# Patient Record
Sex: FEMALE | Race: WHITE | NOT HISPANIC OR LATINO | Employment: OTHER | URBAN - METROPOLITAN AREA
[De-identification: names, ages, dates, MRNs, and addresses within clinical notes are randomized per-mention and may not be internally consistent; named-entity substitution may affect disease eponyms.]

---

## 2023-08-31 PROBLEM — E11.65 UNCONTROLLED TYPE 2 DIABETES MELLITUS WITH HYPERGLYCEMIA (MULTI): Status: ACTIVE | Noted: 2023-08-31

## 2023-08-31 PROBLEM — E78.2 MIXED HYPERLIPIDEMIA: Status: ACTIVE | Noted: 2023-08-31

## 2023-08-31 PROBLEM — I10 ESSENTIAL HYPERTENSION: Status: ACTIVE | Noted: 2023-08-31

## 2023-08-31 PROBLEM — H26.9 CATARACTA: Status: ACTIVE | Noted: 2023-08-31

## 2023-08-31 RX ORDER — LANCETS 26 GAUGE
EACH MISCELLANEOUS
COMMUNITY
Start: 2019-07-23

## 2023-08-31 RX ORDER — PRAVASTATIN SODIUM 40 MG/1
1 TABLET ORAL DAILY
COMMUNITY
End: 2023-11-20 | Stop reason: ALTCHOICE

## 2023-08-31 RX ORDER — BLOOD SUGAR DIAGNOSTIC
STRIP MISCELLANEOUS
COMMUNITY

## 2023-08-31 RX ORDER — DULAGLUTIDE 1.5 MG/.5ML
INJECTION, SOLUTION SUBCUTANEOUS
COMMUNITY
Start: 2023-05-30 | End: 2023-11-20 | Stop reason: ALTCHOICE

## 2023-08-31 RX ORDER — DULAGLUTIDE 0.75 MG/.5ML
INJECTION, SOLUTION SUBCUTANEOUS
COMMUNITY
Start: 2022-11-29 | End: 2023-11-20 | Stop reason: ALTCHOICE

## 2023-08-31 RX ORDER — METFORMIN HYDROCHLORIDE 500 MG/1
2 TABLET, EXTENDED RELEASE ORAL 2 TIMES DAILY
COMMUNITY
End: 2024-01-02 | Stop reason: SDUPTHER

## 2023-11-18 NOTE — PROGRESS NOTES
HPI:  69 yo with Diabetes 2 (dx 2019) b12 def, Dyslipidemia presents for followup. Last A1c 5.6%, today 5.7%. Pt is testing sugars 1-2 times per day. Pt is having low sugars 0 times/week. Pt's typical blood sugars 's on waking, after dinner up to 170's . Pt is following a carb controlled diet and knows reasonable carb allowances. Pt is able to afford their medications. Pt is not all that active.         T  aking metfomin 500mg er (4), came off trulicity due to cost in july 2020, still expensive at [facility] in 2021/doesn't qualify for pt assistance. Was on trulicity 0.75mg may 2023 visit, pt currently off it due to cost.           Pt has not been taking pravastatin 40mg for some time.           Pt taking b12 1mg day.            /67   Pulse 64   Wt 87.3 kg (192 lb 6.4 oz)   BMI 33.55 kg/m²     Labs:  Lab Results   Component Value Date    WBC 4.4 (L) 05/31/2023    NRBC 0 05/31/2023    RBC 4.80 05/31/2023    HGB 13.0 05/31/2023    HCT 40.1 05/31/2023     05/31/2023     Lab Results   Component Value Date    CALCIUM 9.1 05/31/2023    AST 11 05/31/2023    ALKPHOS 63 05/31/2023    BILITOT 0.8 05/31/2023    PROT 6.5 05/31/2023    ALBUMIN 4.0 05/31/2023    GLOB 2.5 05/31/2023    AGR 1.6 05/31/2023     05/31/2023    K 3.8 05/31/2023     05/31/2023    CO2 25 05/31/2023    ANIONGAP 11 05/31/2023    BUN 11 05/31/2023    CREATININE 0.8 05/31/2023    UREACREAUR 13.8 05/31/2023    GLUCOSE 81 05/31/2023    ALT 9 05/31/2023    EGFR 80 05/31/2023     Lab Results   Component Value Date    CHOL 179 05/31/2023    TRIG 76 05/31/2023    HDL 68 05/31/2023    LDLCALC 96 05/31/2023     Lab Results   Component Value Date    MICROALBCREA 14.0 05/31/2023     Lab Results   Component Value Date    TSH 2.18 07/06/2019     Lab Results   Component Value Date    FIDMBHOU77 425 05/31/2023     Lab Results   Component Value Date    HGBA1C 5.7 11/20/2023         Current Outpatient Medications:     ALPHA LIPOIC ACID  ORAL, Alpha Lipoic Acid, Disp: , Rfl:     Autolet (OneTouch Delica Plus Lanc Dev) lancing device, As directed tid, Disp: , Rfl:     berberine/herbal complex no.18 (BERBERINE-HERBAL COMB NO.18 ORAL), Take by mouth., Disp: , Rfl:     metFORMIN  mg 24 hr tablet, Take 2 tablets (1,000 mg) by mouth 2 times a day., Disp: , Rfl:     OneTouch Ultra Test strip, As directed daily, Disp: , Rfl:     vit B complex 100 combo no.2 (B-100 COMPLEX ORAL), B-100 Complex, Disp: , Rfl:     Review of Systems:  Cardiology: Lightheadedness-denies.  Chest pain-denies.  Leg edema-denies.  Palpitations-denies.  Respiratory: Cough-denies. Shortness of breath-denies.  Wheezing-denies.  Gastroenterology: Constipation-denies.  Diarrhea-denies.  Heartburn-positive.  Endocrinology: Cold intolerance-denies.  Heat intolerance-at times.  Sweats-denies.  Neurology: Headache-denies.  Tremor-denies.  Neuropathy in extremities-denies.  Psychology: Low energy-denies.  Irritability-denies.  Sleep disturbances-denies.    Physical Exam:  General Appearance: pleasant, cooperative, no acute distress  HEENT: no chemosis, no proptosis, no lid lag, no lid retraction  Neck: no lymphadenopathy, no thyromegaly, no dominant thyroid nodules  Heart: no murmurs, regular rate and rhythm, S1 and S2  Lungs: no wheezes, no rhonci, no rales  Extremities: no lower extremity swelling    Assessment and Plan:  1. Uncontrolled type 2 diabetes mellitus with hyperglycemia (CMS/HCC)  -test daily at different times of day, can't afford trulicity currently    2. Mixed hyperlipidemia  -please go back on pravastatin 40mg daily for primary prevention    3. Essential hypertension  -at target on no meds currently       Follow Up:  Maulik 6 months    -labs/tests/notes reviewed  -reviewed and counseled patient on medication monitoring and side effects

## 2023-11-20 ENCOUNTER — OFFICE VISIT (OUTPATIENT)
Dept: ENDOCRINOLOGY | Facility: CLINIC | Age: 68
End: 2023-11-20
Payer: MEDICARE

## 2023-11-20 VITALS
SYSTOLIC BLOOD PRESSURE: 138 MMHG | BODY MASS INDEX: 33.55 KG/M2 | DIASTOLIC BLOOD PRESSURE: 67 MMHG | WEIGHT: 192.4 LBS | HEART RATE: 64 BPM

## 2023-11-20 DIAGNOSIS — I10 ESSENTIAL HYPERTENSION: ICD-10-CM

## 2023-11-20 DIAGNOSIS — E11.65 UNCONTROLLED TYPE 2 DIABETES MELLITUS WITH HYPERGLYCEMIA (MULTI): ICD-10-CM

## 2023-11-20 DIAGNOSIS — E78.2 MIXED HYPERLIPIDEMIA: Primary | ICD-10-CM

## 2023-11-20 LAB — POC HEMOGLOBIN A1C: 5.7 % (ref 4.2–6.5)

## 2023-11-20 PROCEDURE — 1126F AMNT PAIN NOTED NONE PRSNT: CPT | Performed by: INTERNAL MEDICINE

## 2023-11-20 PROCEDURE — 1036F TOBACCO NON-USER: CPT | Performed by: INTERNAL MEDICINE

## 2023-11-20 PROCEDURE — 99213 OFFICE O/P EST LOW 20 MIN: CPT | Performed by: INTERNAL MEDICINE

## 2023-11-20 PROCEDURE — 3075F SYST BP GE 130 - 139MM HG: CPT | Performed by: INTERNAL MEDICINE

## 2023-11-20 PROCEDURE — 83036 HEMOGLOBIN GLYCOSYLATED A1C: CPT | Performed by: INTERNAL MEDICINE

## 2023-11-20 PROCEDURE — 3078F DIAST BP <80 MM HG: CPT | Performed by: INTERNAL MEDICINE

## 2023-11-20 RX ORDER — PRAVASTATIN SODIUM 40 MG/1
40 TABLET ORAL DAILY
Qty: 90 TABLET | Refills: 3 | Status: SHIPPED | OUTPATIENT
Start: 2023-11-20 | End: 2024-05-20 | Stop reason: DRUGHIGH

## 2023-11-20 ASSESSMENT — ENCOUNTER SYMPTOMS
OCCASIONAL FEELINGS OF UNSTEADINESS: 0
LOSS OF SENSATION IN FEET: 0
DEPRESSION: 0

## 2023-11-20 ASSESSMENT — PATIENT HEALTH QUESTIONNAIRE - PHQ9
SUM OF ALL RESPONSES TO PHQ9 QUESTIONS 1 & 2: 0
1. LITTLE INTEREST OR PLEASURE IN DOING THINGS: NOT AT ALL
2. FEELING DOWN, DEPRESSED OR HOPELESS: NOT AT ALL

## 2023-11-20 ASSESSMENT — PAIN SCALES - GENERAL: PAINLEVEL: 0-NO PAIN

## 2024-01-02 DIAGNOSIS — E11.65 UNCONTROLLED TYPE 2 DIABETES MELLITUS WITH HYPERGLYCEMIA (MULTI): Primary | ICD-10-CM

## 2024-01-02 RX ORDER — METFORMIN HYDROCHLORIDE 500 MG/1
1000 TABLET, EXTENDED RELEASE ORAL 2 TIMES DAILY
Qty: 120 TABLET | Refills: 2 | Status: SHIPPED | OUTPATIENT
Start: 2024-01-02 | End: 2024-05-13

## 2024-04-14 NOTE — PATIENT INSTRUCTIONS
It was a pleasure to meet you today.    Tetanus (TDaP) and pneumonia (Prevnar 20) vaccinations in clinic today  Go to pharmacy for other recommended vaccinations including Shingles (Shingrix) and the updated COVID vaccine.    Go to radiology for back and knee xrays, we will call you with all results.    Go to the lab for fasting blood work, we will call you with all results.    You are due for the updated COVID-vaccine as well as the shingles vaccine series (Shingrix), both of these are available at most pharmacies.    Mammogram and bone density (DEXA) scan ordered today, call to schedule.    Physical therapy and cardiology referrals entered today - call to schedule.    Follow-up with Dr. Wills for routine diabetes/A1c check.    Follow-up with me yearly for physical/wellness visit, as needed for acute concerns

## 2024-04-14 NOTE — PROGRESS NOTES
MEDICARE WELLNESS    Chief Complaint   Patient presents with    Annual Exam       HPI:  Jil Harper is a 68 y.o. female here as a new patient for a a Medicare Wellness Visit.    Other medical providers:  Endocrine:  Maulik  Ophthalmology:  eye injections with Dr. Tran in [place] for Dm retinopathy    Mother passed away 1 year ago.  Tried to pick her up and felt a pop in her back.  Pain when walking in lower back after about 10 min    Fell onto R knee on concrete.  Fell end of March.  Ongoing pain but 80% better.    DM2:  Follows with endocrinology (Maulik)  Statin:  yes - pravastatin 40 mg daily  Lab Results   Component Value Date    HGBA1C 5.7 11/20/2023         Health Maintenance    Social History:  Tobacco:  never  EtOH:  no  Patient reports routine vision checks and dental cleanings, and regular exercise .     Advanced Directives:  Patient DOES NOT  have advanced directives in place.  Counseled and discussed importance with patient during visit today.  Provided assistance as necessary.    Opioid Medications:  Does the patient use opioid medications: NO  Name of medication: N/A  If yes, do they take this medicine appropriately: N/A    Overall Health:  How does the patient rate their health status today:  GOOD    Cognitive Screen:  AAAx3  to person, place and time: YES  3 word recall: Banana, Chair, Sunrise  - Immediate recall: YES  - 5 minutes recall: YES 2/3  Impression: No cognitive deficiency observed during screening or encounter today     Vision/Hearing Screen:  Vision:  Hx of DM2, no acute concerns  Hearing screen: reports no difficulty with hearing and passes finger rub test bilaterally    Immunizations:  COVID:  DUE  Flu:  DUE  Tdap: DUE  Pneumonia:  PREVNAR 20 DUE - agreeable today  Shingrix:  DUE    Immunization History   Administered Date(s) Administered    Moderna SARS-CoV-2 Vaccination 03/22/2021, 04/19/2021, 01/03/2022    Pneumococcal conjugate vaccine, 20-valent (PREVNAR 20) 04/15/2024     Pneumococcal polysaccharide vaccine, 23-valent, age 2 years and older (PNEUMOVAX 23) 07/16/2019    Tdap vaccine, age 7 year and older (BOOSTRIX, ADACEL) 04/15/2024       Screenings:  HCV:  DUE  Pap:  No longer indicated  Mammogram:  DUE  Colonoscopy:  Cologuard 8/5/2019 - DUE  DEXA:  DUE    Screening Pap due 21-65, Q3, Q5 with nml HPV after 29 y/o  Screening Mammogram :  yearly ages 40-75, shared decision making age >75  Screening Colonoscopy:  age 45-75, shared decision making age >75  DEXA scan 65 or 60 with risks, v2afewe after  AAA screen men 65-75 men with ANY smoking history  Low dose CT of lungs:  yearly for 50-80 with at least 20 year pack history.  Current smokers and those who quit <15 years ago.  Coronary Ca score men >45, women >55,   Hep C screen:  one time all adults age 18-79        Reviewed:   Past Medical History/Allergies:  Yes  Family History:  Yes  Social History:  Yes  Current Medications:  Yes  Vital Signs:  Yes  Advanced Directives:  discussed  Immunizations:  reviewed today  Home Safety:                    Up & Go test > 30 seconds?  No                   Home have rugs; lack grab bars in bathroom; lack handrail on stairs; have poor lighting?  No                   Hearing difficulties?  No  Geriatric Assessment           ADL areas requiring assistance:  Does not need help with , Dressing, Eating, Ambulating, Toileting, Grooming, Hygiene.            IADL areas requiring assistance:  Does not need help with , Shopping, Housework, Accounting, Transportation, Driving.   Medications reviewed           Current supplements  Reviewed and recorded.   Other providers           Reviewed and recorded  Current providers and suppliers:     PHQ9/GAD7:  Over the past 2 weeks, how often have you been bothered by any of the following problems?  Trouble falling or staying asleep, or sleeping too much: Several days  Feeling tired or having little energy: Several days  Poor appetite or overeating: Several  days  Feeling bad about yourself - or that you are a failure or have let yourself or your family down: Not at all  Trouble concentrating on things, such as reading the newspaper or watching television: Not at all  Moving or speaking so slowly that other people could have noticed? Or the opposite - being so fidgety or restless that you have been moving around a lot more than usual.: Not at all  Thoughts that you would be better off dead or hurting yourself in some way: Not at all  Patient Health Questionnaire-9 Score: 5  Over the last 2 weeks, how often have you been bothered by any of the following problems?  Feeling nervous, anxious, or on edge: Several days  Not being able to stop or control worrying: Several days  Worrying too much about different things: Several days  Trouble relaxing: Not at all  Being so restless that it is hard to sit still: Several days  Becoming easily annoyed or irritable: Not at all  Feeling afraid as if something awful might happen: Not at all  LEIGHTON-7 Total Score: 4      Current Medications  Current Outpatient Medications   Medication Instructions    ALPHA LIPOIC ACID ORAL Alpha Lipoic Acid    Autolet (OneTouch Delica Plus Lanc Dev) lancing device As directed tid    berberine/herbal complex no.18 (BERBERINE-HERBAL COMB NO.18 ORAL) oral    metFORMIN XR (GLUCOPHAGE-XR) 1,000 mg, oral, 2 times daily    OneTouch Ultra Test strip As directed daily<BR>    pravastatin (PRAVACHOL) 40 mg, oral, Daily    vit B complex 100 combo no.2 (B-100 COMPLEX ORAL) B-100 Complex        History  No Known Allergies   Past Medical History:   Diagnosis Date    B12 deficiency     Mixed hyperlipidemia     Uncontrolled type 2 diabetes mellitus with hyperglycemia (Multi)       History reviewed. No pertinent surgical history.  Family History   Problem Relation Name Age of Onset    Diabetes Mother      Other (asbestos of the lung) Father      Cancer Father      Diabetes Paternal Grandmother       Social History      Tobacco Use    Smoking status: Never    Smokeless tobacco: Never   Substance Use Topics    Alcohol use: Never    Drug use: Never     Tobacco Use: Low Risk  (4/15/2024)    Patient History     Smoking Tobacco Use: Never     Smokeless Tobacco Use: Never     Passive Exposure: Not on file        ROS  All pertinent positive symptoms are included in the history of present illness.  All other systems have been reviewed and are negative and noncontributory to this patient's current ailments.    VITAL SIGNS  Vitals:    04/15/24 1033   BP: 130/80   Pulse: 73   Temp: 35.8 °C (96.5 °F)   SpO2: 99%     Vitals:    04/15/24 1033   Weight: 98 kg (216 lb)      Body mass index is 37.9 kg/m².     PHYSICAL EXAM    GENERAL  Well-appearing, pleasant and cooperative.  No acute distress.    HEENT  HEAD:   Normocephalic.  Atraumatic.  EYES:  PERRLA.  No scleral icterus or conjunctival injection.  EARS:  Tympanic membranes visualized bilaterally without erythema, fluid, or bulging.  NECK:  No adenopathy.  No palpable thyroid enlargement or nodules.    THROAT:  Moist oropharynx without tonsillar enlargement or exudates.    LUNGS:    Clear to auscultation bilaterally.  No wheezes, rales, rhonchi.    CARDIAC:  +Murmur.  Regular rate and rhythm.  Normal S1S2.  No rubs/gallops.    ABDOMEN:  Soft, non-tender, non-distended.  No hepatosplenomegaly.  Normoactive bowel sounds.    MUSCULOSKELETAL:  Mild TTP along medial R knee without bruising, swelling, deformity, breaks in skin or increased warmth.  No other gross abnormalities.   No joint swelling or erythema,.  No spinal or paraspinal tenderness to palpation.    EXTREMITIES:  No LE edema or cyanosis.      NEURO           Alert and oriented x3. No focal deficits.    PSYCH:          Affect appropriate.     Preventative Services reviewed with patient, instructions provided    Assessment/Plan   Problem List Items Addressed This Visit       Essential hypertension - Primary    Relevant Orders     Referral to Cardiology     Other Visit Diagnoses       Annual physical exam        Relevant Orders    Lipid Panel    Hepatitis C Antibody    TSH with reflex to Free T4 if abnormal    Comprehensive Metabolic Panel    CBC    Vitamin D deficiency        Relevant Orders    Vitamin D 25-Hydroxy,Total (for eval of Vitamin D levels)    Screening for cardiovascular condition        Relevant Orders    Lipid Panel    Encounter for screening mammogram for malignant neoplasm of breast        Relevant Orders    BI mammo bilateral screening tomosynthesis    Encounter for screening for malignant neoplasm of colon        Relevant Orders    Cologuard® colon cancer screening    Asymptomatic menopausal state        Relevant Orders    XR DEXA bone density    Chronic bilateral low back pain without sciatica        Relevant Orders    XR lumbar spine 2-3 views    Referral to Physical Therapy    Encounter for immunization        Relevant Orders    Tdap vaccine, age 7 years and older  (BOOSTRIX) (Completed)    Pneumococcal conjugate vaccine, 20-valent (PREVNAR 20) (Completed)    Easy bruising        Relevant Orders    Coagulation Screen    Right knee pain, unspecified chronicity        Type 2 diabetes mellitus with retinopathy, without long-term current use of insulin, macular edema presence unspecified, unspecified laterality, unspecified retinopathy severity (Multi)        Relevant Orders    CBC    Referral to Cardiology    Heart murmur        Relevant Orders    ECG 12 lead (Clinic Performed)    Referral to Cardiology                 Nette Duckworth MD

## 2024-04-15 ENCOUNTER — OFFICE VISIT (OUTPATIENT)
Dept: PRIMARY CARE | Facility: CLINIC | Age: 69
End: 2024-04-15
Payer: MEDICARE

## 2024-04-15 ENCOUNTER — HOSPITAL ENCOUNTER (OUTPATIENT)
Dept: RADIOLOGY | Facility: CLINIC | Age: 69
Discharge: HOME | End: 2024-04-15
Payer: MEDICARE

## 2024-04-15 VITALS
DIASTOLIC BLOOD PRESSURE: 80 MMHG | HEART RATE: 73 BPM | WEIGHT: 216 LBS | BODY MASS INDEX: 38.27 KG/M2 | HEIGHT: 63 IN | TEMPERATURE: 96.5 F | SYSTOLIC BLOOD PRESSURE: 130 MMHG | OXYGEN SATURATION: 99 %

## 2024-04-15 DIAGNOSIS — R01.1 HEART MURMUR: ICD-10-CM

## 2024-04-15 DIAGNOSIS — Z12.31 ENCOUNTER FOR SCREENING MAMMOGRAM FOR MALIGNANT NEOPLASM OF BREAST: ICD-10-CM

## 2024-04-15 DIAGNOSIS — Z78.0 ASYMPTOMATIC MENOPAUSAL STATE: ICD-10-CM

## 2024-04-15 DIAGNOSIS — I10 ESSENTIAL HYPERTENSION: ICD-10-CM

## 2024-04-15 DIAGNOSIS — Z12.11 ENCOUNTER FOR SCREENING FOR MALIGNANT NEOPLASM OF COLON: ICD-10-CM

## 2024-04-15 DIAGNOSIS — R23.3 EASY BRUISING: ICD-10-CM

## 2024-04-15 DIAGNOSIS — Z23 ENCOUNTER FOR IMMUNIZATION: ICD-10-CM

## 2024-04-15 DIAGNOSIS — E55.9 VITAMIN D DEFICIENCY: ICD-10-CM

## 2024-04-15 DIAGNOSIS — M25.561 RIGHT KNEE PAIN, UNSPECIFIED CHRONICITY: ICD-10-CM

## 2024-04-15 DIAGNOSIS — E11.319 TYPE 2 DIABETES MELLITUS WITH RETINOPATHY, WITHOUT LONG-TERM CURRENT USE OF INSULIN, MACULAR EDEMA PRESENCE UNSPECIFIED, UNSPECIFIED LATERALITY, UNSPECIFIED RETINOPATHY SEVERITY (MULTI): ICD-10-CM

## 2024-04-15 DIAGNOSIS — G89.29 CHRONIC BILATERAL LOW BACK PAIN WITHOUT SCIATICA: ICD-10-CM

## 2024-04-15 DIAGNOSIS — M54.50 CHRONIC BILATERAL LOW BACK PAIN WITHOUT SCIATICA: ICD-10-CM

## 2024-04-15 DIAGNOSIS — Z13.6 SCREENING FOR CARDIOVASCULAR CONDITION: ICD-10-CM

## 2024-04-15 DIAGNOSIS — Z00.00 ANNUAL PHYSICAL EXAM: Primary | ICD-10-CM

## 2024-04-15 PROCEDURE — 93010 ELECTROCARDIOGRAM REPORT: CPT | Performed by: STUDENT IN AN ORGANIZED HEALTH CARE EDUCATION/TRAINING PROGRAM

## 2024-04-15 PROCEDURE — G0009 ADMIN PNEUMOCOCCAL VACCINE: HCPCS | Mod: 59 | Performed by: STUDENT IN AN ORGANIZED HEALTH CARE EDUCATION/TRAINING PROGRAM

## 2024-04-15 PROCEDURE — G0439 PPPS, SUBSEQ VISIT: HCPCS | Performed by: STUDENT IN AN ORGANIZED HEALTH CARE EDUCATION/TRAINING PROGRAM

## 2024-04-15 PROCEDURE — 1159F MED LIST DOCD IN RCRD: CPT | Performed by: STUDENT IN AN ORGANIZED HEALTH CARE EDUCATION/TRAINING PROGRAM

## 2024-04-15 PROCEDURE — 93005 ELECTROCARDIOGRAM TRACING: CPT | Performed by: STUDENT IN AN ORGANIZED HEALTH CARE EDUCATION/TRAINING PROGRAM

## 2024-04-15 PROCEDURE — 72100 X-RAY EXAM L-S SPINE 2/3 VWS: CPT

## 2024-04-15 PROCEDURE — 1036F TOBACCO NON-USER: CPT | Performed by: STUDENT IN AN ORGANIZED HEALTH CARE EDUCATION/TRAINING PROGRAM

## 2024-04-15 PROCEDURE — 73562 X-RAY EXAM OF KNEE 3: CPT | Mod: RIGHT SIDE | Performed by: RADIOLOGY

## 2024-04-15 PROCEDURE — 72100 X-RAY EXAM L-S SPINE 2/3 VWS: CPT | Performed by: RADIOLOGY

## 2024-04-15 PROCEDURE — 90715 TDAP VACCINE 7 YRS/> IM: CPT | Performed by: STUDENT IN AN ORGANIZED HEALTH CARE EDUCATION/TRAINING PROGRAM

## 2024-04-15 PROCEDURE — 3079F DIAST BP 80-89 MM HG: CPT | Performed by: STUDENT IN AN ORGANIZED HEALTH CARE EDUCATION/TRAINING PROGRAM

## 2024-04-15 PROCEDURE — 1125F AMNT PAIN NOTED PAIN PRSNT: CPT | Performed by: STUDENT IN AN ORGANIZED HEALTH CARE EDUCATION/TRAINING PROGRAM

## 2024-04-15 PROCEDURE — 1124F ACP DISCUSS-NO DSCNMKR DOCD: CPT | Performed by: STUDENT IN AN ORGANIZED HEALTH CARE EDUCATION/TRAINING PROGRAM

## 2024-04-15 PROCEDURE — 3075F SYST BP GE 130 - 139MM HG: CPT | Performed by: STUDENT IN AN ORGANIZED HEALTH CARE EDUCATION/TRAINING PROGRAM

## 2024-04-15 PROCEDURE — 99215 OFFICE O/P EST HI 40 MIN: CPT | Performed by: STUDENT IN AN ORGANIZED HEALTH CARE EDUCATION/TRAINING PROGRAM

## 2024-04-15 PROCEDURE — 73562 X-RAY EXAM OF KNEE 3: CPT | Mod: RT

## 2024-04-15 ASSESSMENT — PAIN SCALES - GENERAL: PAINLEVEL: 4

## 2024-04-15 ASSESSMENT — PATIENT HEALTH QUESTIONNAIRE - PHQ9
SUM OF ALL RESPONSES TO PHQ QUESTIONS 1-9: 5
8. MOVING OR SPEAKING SO SLOWLY THAT OTHER PEOPLE COULD HAVE NOTICED. OR THE OPPOSITE, BEING SO FIGETY OR RESTLESS THAT YOU HAVE BEEN MOVING AROUND A LOT MORE THAN USUAL: NOT AT ALL
1. LITTLE INTEREST OR PLEASURE IN DOING THINGS: SEVERAL DAYS
7. TROUBLE CONCENTRATING ON THINGS, SUCH AS READING THE NEWSPAPER OR WATCHING TELEVISION: NOT AT ALL
6. FEELING BAD ABOUT YOURSELF - OR THAT YOU ARE A FAILURE OR HAVE LET YOURSELF OR YOUR FAMILY DOWN: NOT AT ALL
SUM OF ALL RESPONSES TO PHQ9 QUESTIONS 1 AND 2: 2
4. FEELING TIRED OR HAVING LITTLE ENERGY: SEVERAL DAYS
5. POOR APPETITE OR OVEREATING: SEVERAL DAYS
2. FEELING DOWN, DEPRESSED OR HOPELESS: SEVERAL DAYS
3. TROUBLE FALLING OR STAYING ASLEEP OR SLEEPING TOO MUCH: SEVERAL DAYS
9. THOUGHTS THAT YOU WOULD BE BETTER OFF DEAD, OR OF HURTING YOURSELF: NOT AT ALL
10. IF YOU CHECKED OFF ANY PROBLEMS, HOW DIFFICULT HAVE THESE PROBLEMS MADE IT FOR YOU TO DO YOUR WORK, TAKE CARE OF THINGS AT HOME, OR GET ALONG WITH OTHER PEOPLE: NOT DIFFICULT AT ALL

## 2024-04-15 ASSESSMENT — ANXIETY QUESTIONNAIRES
IF YOU CHECKED OFF ANY PROBLEMS ON THIS QUESTIONNAIRE, HOW DIFFICULT HAVE THESE PROBLEMS MADE IT FOR YOU TO DO YOUR WORK, TAKE CARE OF THINGS AT HOME, OR GET ALONG WITH OTHER PEOPLE: NOT DIFFICULT AT ALL
3. WORRYING TOO MUCH ABOUT DIFFERENT THINGS: SEVERAL DAYS
4. TROUBLE RELAXING: NOT AT ALL
6. BECOMING EASILY ANNOYED OR IRRITABLE: NOT AT ALL
5. BEING SO RESTLESS THAT IT IS HARD TO SIT STILL: SEVERAL DAYS
2. NOT BEING ABLE TO STOP OR CONTROL WORRYING: SEVERAL DAYS
7. FEELING AFRAID AS IF SOMETHING AWFUL MIGHT HAPPEN: NOT AT ALL
GAD7 TOTAL SCORE: 4
1. FEELING NERVOUS, ANXIOUS, OR ON EDGE: SEVERAL DAYS

## 2024-04-15 ASSESSMENT — ENCOUNTER SYMPTOMS
LOSS OF SENSATION IN FEET: 1
DEPRESSION: 1
OCCASIONAL FEELINGS OF UNSTEADINESS: 1

## 2024-04-16 ENCOUNTER — LAB (OUTPATIENT)
Dept: LAB | Facility: LAB | Age: 69
End: 2024-04-16
Payer: MEDICARE

## 2024-04-16 DIAGNOSIS — E11.319 TYPE 2 DIABETES MELLITUS WITH RETINOPATHY, WITHOUT LONG-TERM CURRENT USE OF INSULIN, MACULAR EDEMA PRESENCE UNSPECIFIED, UNSPECIFIED LATERALITY, UNSPECIFIED RETINOPATHY SEVERITY (MULTI): ICD-10-CM

## 2024-04-16 DIAGNOSIS — R23.3 EASY BRUISING: ICD-10-CM

## 2024-04-16 DIAGNOSIS — D50.9 MICROCYTIC ANEMIA: Primary | ICD-10-CM

## 2024-04-16 DIAGNOSIS — E55.9 VITAMIN D DEFICIENCY: ICD-10-CM

## 2024-04-16 DIAGNOSIS — Z13.6 SCREENING FOR CARDIOVASCULAR CONDITION: ICD-10-CM

## 2024-04-16 DIAGNOSIS — Z00.00 ANNUAL PHYSICAL EXAM: ICD-10-CM

## 2024-04-16 LAB
25(OH)D3 SERPL-MCNC: 12 NG/ML (ref 31–100)
ALBUMIN SERPL-MCNC: 4 G/DL (ref 3.5–5)
ALP BLD-CCNC: 79 U/L (ref 35–125)
ALT SERPL-CCNC: 8 U/L (ref 5–40)
ANION GAP SERPL CALC-SCNC: 12 MMOL/L
APTT PPP: 25.2 SECONDS (ref 22–32.5)
AST SERPL-CCNC: 12 U/L (ref 5–40)
BILIRUB SERPL-MCNC: 0.7 MG/DL (ref 0.1–1.2)
BUN SERPL-MCNC: 17 MG/DL (ref 8–25)
CALCIUM SERPL-MCNC: 9.1 MG/DL (ref 8.5–10.4)
CHLORIDE SERPL-SCNC: 106 MMOL/L (ref 97–107)
CHOLEST SERPL-MCNC: 171 MG/DL (ref 133–200)
CHOLEST/HDLC SERPL: 2.6 {RATIO}
CO2 SERPL-SCNC: 26 MMOL/L (ref 24–31)
CREAT SERPL-MCNC: 0.7 MG/DL (ref 0.4–1.6)
EGFRCR SERPLBLD CKD-EPI 2021: >90 ML/MIN/1.73M*2
ERYTHROCYTE [DISTWIDTH] IN BLOOD BY AUTOMATED COUNT: 16.5 % (ref 11.5–14.5)
FERRITIN SERPL-MCNC: 10 NG/ML (ref 13–150)
GLUCOSE SERPL-MCNC: 93 MG/DL (ref 65–99)
HCT VFR BLD AUTO: 33.3 % (ref 36–46)
HDLC SERPL-MCNC: 65 MG/DL
HGB BLD-MCNC: 10 G/DL (ref 12–16)
INR PPP: 1 (ref 0.9–1.2)
IRON SATN MFR SERPL: 10 % (ref 12–50)
IRON SERPL-MCNC: 42 UG/DL (ref 30–160)
LDLC SERPL CALC-MCNC: 83 MG/DL (ref 65–130)
MCH RBC QN AUTO: 23.7 PG (ref 26–34)
MCHC RBC AUTO-ENTMCNC: 30 G/DL (ref 32–36)
MCV RBC AUTO: 79 FL (ref 80–100)
NRBC BLD-RTO: 0 /100 WBCS (ref 0–0)
PLATELET # BLD AUTO: 227 X10*3/UL (ref 150–450)
POTASSIUM SERPL-SCNC: 4.1 MMOL/L (ref 3.4–5.1)
PROT SERPL-MCNC: 6.3 G/DL (ref 5.9–7.9)
PROTHROMBIN TIME: 10.9 SECONDS (ref 9.3–12.7)
RBC # BLD AUTO: 4.22 X10*6/UL (ref 4–5.2)
SODIUM SERPL-SCNC: 144 MMOL/L (ref 133–145)
TIBC SERPL-MCNC: 405 UG/DL (ref 228–428)
TRIGL SERPL-MCNC: 115 MG/DL (ref 40–150)
TSH SERPL DL<=0.05 MIU/L-ACNC: 2.02 MIU/L (ref 0.27–4.2)
UIBC SERPL-MCNC: 363 UG/DL (ref 110–370)
WBC # BLD AUTO: 5 X10*3/UL (ref 4.4–11.3)

## 2024-04-16 PROCEDURE — 83540 ASSAY OF IRON: CPT

## 2024-04-16 PROCEDURE — 82306 VITAMIN D 25 HYDROXY: CPT

## 2024-04-16 PROCEDURE — 83550 IRON BINDING TEST: CPT

## 2024-04-16 PROCEDURE — 84443 ASSAY THYROID STIM HORMONE: CPT

## 2024-04-16 PROCEDURE — 86803 HEPATITIS C AB TEST: CPT

## 2024-04-16 PROCEDURE — 80061 LIPID PANEL: CPT

## 2024-04-16 PROCEDURE — 85730 THROMBOPLASTIN TIME PARTIAL: CPT

## 2024-04-16 PROCEDURE — 85027 COMPLETE CBC AUTOMATED: CPT

## 2024-04-16 PROCEDURE — 82728 ASSAY OF FERRITIN: CPT

## 2024-04-16 PROCEDURE — 85610 PROTHROMBIN TIME: CPT

## 2024-04-16 PROCEDURE — 36415 COLL VENOUS BLD VENIPUNCTURE: CPT

## 2024-04-16 PROCEDURE — 80053 COMPREHEN METABOLIC PANEL: CPT

## 2024-04-17 ENCOUNTER — TELEPHONE (OUTPATIENT)
Dept: PRIMARY CARE | Facility: CLINIC | Age: 69
End: 2024-04-17
Payer: MEDICARE

## 2024-04-17 DIAGNOSIS — M47.816 OSTEOARTHRITIS OF LUMBAR SPINE, UNSPECIFIED SPINAL OSTEOARTHRITIS COMPLICATION STATUS: ICD-10-CM

## 2024-04-17 DIAGNOSIS — I70.90 ARTERIAL CALCIFICATION: ICD-10-CM

## 2024-04-17 DIAGNOSIS — S32.020A COMPRESSION FRACTURE OF L2 VERTEBRA, INITIAL ENCOUNTER (MULTI): Primary | ICD-10-CM

## 2024-04-17 LAB — HCV AB SER QL: NONREACTIVE

## 2024-04-17 RX ORDER — ERGOCALCIFEROL 1.25 MG/1
50000 CAPSULE ORAL
Qty: 12 CAPSULE | Refills: 0 | Status: SHIPPED | OUTPATIENT
Start: 2024-04-21 | End: 2024-07-14

## 2024-04-18 ENCOUNTER — PHARMACY VISIT (OUTPATIENT)
Dept: PHARMACY | Facility: CLINIC | Age: 69
End: 2024-04-18

## 2024-04-18 PROCEDURE — RXMED WILLOW AMBULATORY MEDICATION CHARGE

## 2024-04-22 NOTE — TELEPHONE ENCOUNTER
Nette Duckworth MD  4/17/2024 11:46 AM EDT       Lumbar spine x-ray showing MILD L2 COMPRESSION FRACTURE as well as OTHER DEGENERATIVE CHANGES OF LOWER LUMBAR SPINE.  Recommend further evaluation and overview of treatment options with ortho spine.  Will enter referral once patient is notified and agreeable.      Nette Duckworth MD  4/17/2024 12:00 PM EDT       Knee xray showing:  - MILD-MODERATE DEGENERATIVE CHANGES (ARTHRITIS), no acute fracture or other injury.  -INCIDENTAL FINDING OF FEMORAL ARTERY VASCULAR CALCIFICATIONS - recommend vascular surgery follow up for further evaluation of this finding.     Please place the referrals for patient - she is notified and agreeable to the referrals. She is already given the referral scheduling number, patient will not need a call back.

## 2024-04-30 LAB — NONINV COLON CA DNA+OCC BLD SCRN STL QL: NEGATIVE

## 2024-05-01 ENCOUNTER — HOSPITAL ENCOUNTER (OUTPATIENT)
Dept: RADIOLOGY | Facility: HOSPITAL | Age: 69
Discharge: HOME | End: 2024-05-01
Payer: MEDICARE

## 2024-05-01 VITALS — HEIGHT: 63 IN | WEIGHT: 216 LBS | BODY MASS INDEX: 38.27 KG/M2

## 2024-05-01 DIAGNOSIS — Z12.31 ENCOUNTER FOR SCREENING MAMMOGRAM FOR MALIGNANT NEOPLASM OF BREAST: ICD-10-CM

## 2024-05-01 DIAGNOSIS — Z78.0 ASYMPTOMATIC MENOPAUSAL STATE: ICD-10-CM

## 2024-05-01 PROCEDURE — 77080 DXA BONE DENSITY AXIAL: CPT

## 2024-05-01 PROCEDURE — 77067 SCR MAMMO BI INCL CAD: CPT

## 2024-05-01 PROCEDURE — 77080 DXA BONE DENSITY AXIAL: CPT | Performed by: RADIOLOGY

## 2024-05-01 PROCEDURE — 77067 SCR MAMMO BI INCL CAD: CPT | Performed by: RADIOLOGY

## 2024-05-01 PROCEDURE — 77063 BREAST TOMOSYNTHESIS BI: CPT | Performed by: RADIOLOGY

## 2024-05-03 ENCOUNTER — EVALUATION (OUTPATIENT)
Dept: PHYSICAL THERAPY | Facility: CLINIC | Age: 69
End: 2024-05-03
Payer: MEDICARE

## 2024-05-03 DIAGNOSIS — G89.29 CHRONIC BILATERAL LOW BACK PAIN WITHOUT SCIATICA: ICD-10-CM

## 2024-05-03 DIAGNOSIS — M54.50 CHRONIC BILATERAL LOW BACK PAIN WITHOUT SCIATICA: ICD-10-CM

## 2024-05-03 PROCEDURE — 97162 PT EVAL MOD COMPLEX 30 MIN: CPT | Mod: GP | Performed by: PHYSICAL THERAPIST

## 2024-05-03 PROCEDURE — 97530 THERAPEUTIC ACTIVITIES: CPT | Mod: GP | Performed by: PHYSICAL THERAPIST

## 2024-05-03 ASSESSMENT — PAIN SCALES - GENERAL: PAINLEVEL_OUTOF10: 3

## 2024-05-03 ASSESSMENT — PAIN - FUNCTIONAL ASSESSMENT: PAIN_FUNCTIONAL_ASSESSMENT: 0-10

## 2024-05-03 NOTE — PROGRESS NOTES
Physical Therapy Evaluation and Treatment      Patient Name: Jil Harper  MRN: [id number]  Today's Date: 5/3/2024  Time Calculation  Start Time: 0937  Stop Time: 1020  Time Calculation (min): 43 min  PT Therapeutic Procedures Time Entry  Therapeutic Activity Time Entry: 13  Moderate complexity due to patient's clinical presentation being evolving with changing characteristics, with comorbidities/complexities to include arthritis, DM 2, chronic pain, all of which may negatively impact rehab tolerance and progression.    Insurance:  Visit number: 1 of 9  Authorization info: NO AUTH / [place] VISITS  Insurance Type: Payor: MEDICARE / Plan: MEDICARE PART A AND B / Product Type: *No Product type* /     Current Problem:   1. Chronic bilateral low back pain without sciatica  Referral to Physical Therapy    Follow Up In Physical Therapy          Subjective    General:  General  General Comment: Pt fell at the end of march, she has noticed difficulty going up/down stairs, she also has difficulty with walking more than 10 min without low back pain. She thinks the day she fell she was rushing to get to a chair due to her back. She feels her R knee has been unstable after the fall but she does not report the knee giving out on her since the fall and is no longer having any pain in the knee. She reports the back only hurts when she is standing or walking too long. She does not report any paresthesia but does have history of B foot neuropathy.  Precautions:  Precautions  Precautions Comment: Falls  Pain:  Pain Assessment  Pain Assessment: 0-10  Pain Score: 3  Pain Type: Chronic pain  Pain Location: Back  Pain Orientation: Mid  Home Living:   Lives with family; 1st floor living with laundry in basement, 3 steps into home   Prior Level of Function:  Prior Function Per Pt/Caregiver Report  Level of [place]: Independent with ADLs and functional transfers, Independent with homemaking with ambulation    Objective     Functional  Assessments:  Gait Comment: reduced puma, slight hip drop R stance  , Stairs Comment: reciprocal pattern with use of hand rail, noted reduction in both concentric and eccentric control  , and Transfers Comment: B UE assist required for sit <> stand    Lumbar Spine    Lumbar AROM  Lumbar AROM WFL: yes (Age appropriate limits)  Hip AROM  Hip AROM WFL: yes (age appropriate limits)    , HIP  servation     Hip Palpation/Joint Mobility   Palpation / Joint Mobility Comment: B Glut along iliac crest R > L; PSIS B R > L  Lumbar AROM  Lumbar AROM WFL: yes (Age appropriate limits)  Hip AROM  Hip AROM WFL: yes (age appropriate limits)    Specific Lower Extremity MMT  R Iliopsoas: (5/5): 4  L Iliopsoas: (5/5): 5  R Gluteals (sidelying): (5/5): 4+  L Gluteals (sidelying): (5/5): 4  R Hip External Rotation: (5/5): 5  L Hip External Rotation: (5/5): 5  R knee flexion: (5/5): 5  L knee flexion: (5/5): 5  R knee extension: (5/5): 5  L knee extension: (5/5): 5      , and KNEE    Observation  Observation Comment: Reduced R quad contraction strength  Knee Palpation/Joint Mobility  Palpation/Joint Mobility Comment: R saphenous point; R adductor mid;  Knee AROM  R knee flexion: (140°): 125  R knee extension: (0°): 5    Flexibility  Flexibility Comment: + R calf tightness    Outcome Measures:  Other Measures  Oswestry Disablity Index (YOSELYN): 19     Treatments:  Therapeutic Exercise  Therapeutic Exercise Performed: Yes  Therapeutic Exercise Activity 1: Access Code [email or web address]  - Seated Transversus Abdominis Bracing  - 1 x daily - 7 x weekly - 3 sets - 10 reps  - Seated March with Resistance and Abdominal brace  - 1 x daily - 7 x weekly - 3 sets - 10 reps  - Seated Hip Abduction with Resistance  - 1 x daily - 7 x weekly - 3 sets - 10 reps  - Seated Isometric Hip Adduction with Ball  - 1 x daily - 7 x weekly - 3 sets - 10 reps  - Seated Long Arc Quad  - 1 x daily - 7 x weekly - 3 sets - 10 reps  Therapeutic Activity  Therapeutic Activity  Performed: Yes  Therapeutic Activity 1: educated on anatomy in relation to current findings with use of model  Therapeutic Activity 2: educated on HEP and POC      Assessment   Assessment:  PT Assessment Results: Decreased strength, Decreased range of motion, Decreased endurance, Impaired balance, Pain  Rehab Prognosis: Good  Assessment Comment: Pt is a 68 y.o female presenting to therapy with low back pain and R LE weakness throughout hip and knee creating mobiltiy deficits. Pt demonstrated functional ROM of spine in age appropriat elimits pt does have L2 compression fracture with found degenerative changes on x-ray and demonstrated flexion biased findings. Weakness throughout R hip musculature creating reduciton in lumbopelvic stability and further strain on lumbar structures and difficulty with ambulation, transfers and stair negotiation. At this time pt would benefit form skilled physical therapy in order to prevent further functional decline.    Plan:  Treatment/Interventions: Dry needling, Education/ Instruction, Gait training, Manual therapy, Neuromuscular re-education, Taping techniques, Therapeutic activities, Therapeutic exercises  PT Plan: Skilled PT  PT Frequency: 1 time per week  Duration: 8 weeks + eval  Onset Date: 05/03/24  Certification Period Start Date: 05/03/24  Certification Period End Date: 08/01/24  Number of Treatments Authorized: [place]  Rehab Potential: Good  Plan of Care Agreement: Patient    OP EDUCATION:  Outpatient Education  Individual(s) Educated: Patient  Education Provided: Anatomy, Body Mechanics, Home Exercise Program, POC  Risk and Benefits Discussed with Patient/Caregiver/Other: yes  Patient/Caregiver Demonstrated Understanding: yes  Plan of Care Discussed and Agreed Upon: yes  Patient Response to Education: Patient/Caregiver Performed Return Demonstration of Exercises/Activities, Patient/Caregiver Asked Appropriate Questions    Goals:  Active       PT Problem - STG       PT Goal 1        Start:  05/03/24    Expected End:  06/12/24       Pt will be 50% IND with HEP in 4 weeks in order to progress with therapy.          PT Goal 2       Start:  05/03/24    Expected End:  06/12/24       Pt will reduce pain levels to no more than 1/10 in 4 weeks in order to improve ambulation.          PT Goal 3       Start:  05/03/24    Expected End:  06/12/24       Pt will be able to perform prolonged standing and ambulation for 20 min in 4 weeks required for grocery shopping, cooking and cleaning..          PT Goal 4       Start:  05/03/24    Expected End:  06/12/24       Pt will demonstrate subjective improvement of ADLs and recreational activities through improved score of 12 on YOSELYN in 4 weeks.             PT Problem _ LTG       PT Goal 1       Start:  05/03/24    Expected End:  08/01/24       Pt will be 100% IND with HEP in 8 weeks in order to maintain progress with therapy.           PT Goal 2       Start:  05/03/24    Expected End:  08/01/24       Pt will be able to perform ascending/descending reciprocal pattern of 4 steps in 8 weeks for home navigation needs.          PT Goal 3       Start:  05/03/24    Expected End:  08/01/24       Pt will demonstrate subjective improvement of ADLs and recreational activities through improved score of 5 on YOSELYN in 8 weeks to perform recreational walking.          PT Goal 4       Start:  05/03/24    Expected End:  08/01/24       Pt will be able to perform prolonged standing/ambulation for 30 min in 8 weeks required for grocery shopping, cooking and clenaing.

## 2024-05-08 ENCOUNTER — OFFICE VISIT (OUTPATIENT)
Dept: CARDIOLOGY | Facility: CLINIC | Age: 69
End: 2024-05-08
Payer: MEDICARE

## 2024-05-08 VITALS
SYSTOLIC BLOOD PRESSURE: 127 MMHG | HEIGHT: 63 IN | RESPIRATION RATE: 18 BRPM | DIASTOLIC BLOOD PRESSURE: 92 MMHG | BODY MASS INDEX: 37.21 KG/M2 | WEIGHT: 210 LBS | HEART RATE: 85 BPM

## 2024-05-08 DIAGNOSIS — I70.90 ARTERIAL CALCIFICATION: ICD-10-CM

## 2024-05-08 DIAGNOSIS — E11.319 TYPE 2 DIABETES MELLITUS WITH RETINOPATHY, WITHOUT LONG-TERM CURRENT USE OF INSULIN, MACULAR EDEMA PRESENCE UNSPECIFIED, UNSPECIFIED LATERALITY, UNSPECIFIED RETINOPATHY SEVERITY (MULTI): ICD-10-CM

## 2024-05-08 DIAGNOSIS — R01.1 HEART MURMUR: ICD-10-CM

## 2024-05-08 DIAGNOSIS — I10 ESSENTIAL HYPERTENSION: ICD-10-CM

## 2024-05-08 PROCEDURE — 3074F SYST BP LT 130 MM HG: CPT | Performed by: INTERNAL MEDICINE

## 2024-05-08 PROCEDURE — 1126F AMNT PAIN NOTED NONE PRSNT: CPT | Performed by: INTERNAL MEDICINE

## 2024-05-08 PROCEDURE — 99204 OFFICE O/P NEW MOD 45 MIN: CPT | Performed by: INTERNAL MEDICINE

## 2024-05-08 PROCEDURE — 1159F MED LIST DOCD IN RCRD: CPT | Performed by: INTERNAL MEDICINE

## 2024-05-08 PROCEDURE — 1036F TOBACCO NON-USER: CPT | Performed by: INTERNAL MEDICINE

## 2024-05-08 PROCEDURE — 3080F DIAST BP >= 90 MM HG: CPT | Performed by: INTERNAL MEDICINE

## 2024-05-08 PROCEDURE — 99214 OFFICE O/P EST MOD 30 MIN: CPT | Performed by: INTERNAL MEDICINE

## 2024-05-08 PROCEDURE — 3048F LDL-C <100 MG/DL: CPT | Performed by: INTERNAL MEDICINE

## 2024-05-08 RX ORDER — FERROUS SULFATE 325(65) MG
65 TABLET, DELAYED RELEASE (ENTERIC COATED) ORAL
COMMUNITY

## 2024-05-08 ASSESSMENT — PATIENT HEALTH QUESTIONNAIRE - PHQ9
SUM OF ALL RESPONSES TO PHQ9 QUESTIONS 1 AND 2: 0
1. LITTLE INTEREST OR PLEASURE IN DOING THINGS: NOT AT ALL
2. FEELING DOWN, DEPRESSED OR HOPELESS: NOT AT ALL

## 2024-05-08 ASSESSMENT — LIFESTYLE VARIABLES
HOW OFTEN DO YOU HAVE A DRINK CONTAINING ALCOHOL: NEVER
AUDIT-C TOTAL SCORE: 0
HOW MANY STANDARD DRINKS CONTAINING ALCOHOL DO YOU HAVE ON A TYPICAL DAY: PATIENT DOES NOT DRINK
HOW MANY STANDARD DRINKS CONTAINING ALCOHOL DO YOU HAVE ON A TYPICAL DAY: PATIENT DOES NOT DRINK
HOW OFTEN DO YOU HAVE SIX OR MORE DRINKS ON ONE OCCASION: NEVER
HOW OFTEN DO YOU HAVE A DRINK CONTAINING ALCOHOL: NEVER
AUDIT-C TOTAL SCORE: 0
HOW OFTEN DO YOU HAVE SIX OR MORE DRINKS ON ONE OCCASION: NEVER
SKIP TO QUESTIONS 9-10: 1
SKIP TO QUESTIONS 9-10: 1

## 2024-05-08 ASSESSMENT — ENCOUNTER SYMPTOMS
DEPRESSION: 0
OCCASIONAL FEELINGS OF UNSTEADINESS: 1
LOSS OF SENSATION IN FEET: 0

## 2024-05-08 ASSESSMENT — PAIN SCALES - GENERAL: PAINLEVEL: 0-NO PAIN

## 2024-05-08 NOTE — PROGRESS NOTES
Primary Care Physician: Nette Duckworth MD   Date of Visit: 05/08/2024  9:20 AM EDT  Type of Visit: New patient visit    Chief Complaint:  Chief Complaint   Patient presents with    Consult     Referral from  for Heart Murmer and HTN, also has concerns about circulation in right leg        HPI  Jil Harper 68 y.o. female who presents establish care.    She is here for multiple reasons:  1.  Newly discovered heart murmur,  2.  Concern for calcification of her femoral artery noted on a x-ray taken of her knee,  3.  Hypertension    She denies any angina, shortness of breath, paroxysmal nocturnal dyspnea, orthopnea, peripheral edema, near-syncope, syncope, palpitations.  She denies any claudication, rest pain, nonhealing wounds.    No family history of early coronary artery disease or stroke.  Brother with significant peripheral arterial disease.    Review of Systems   12 point review of systems was obtained in detail and is negative other than that noted above or below.       Past Medical/Surgical History:  Jil has a past medical history of B12 deficiency, Mixed hyperlipidemia, and Uncontrolled type 2 diabetes mellitus with hyperglycemia (Multi).    Jil has a past surgical history that includes Leg Surgery and Tonsillectomy (Bilateral).    Social/Family History:  Jil reports that she has never smoked. She has never used smokeless tobacco. She reports that she does not drink alcohol and does not use drugs.    Jil's family history includes Cancer in her father; Diabetes in her mother and paternal grandmother; asbestos of the lung in her father.    Allergies:  No Known Allergies    Medications:  Current Outpatient Medications on File Prior to Visit   Medication Sig    ALPHA LIPOIC ACID ORAL Alpha Lipoic Acid    Autolet (OneTouch Delica Plus Lanc Dev) lancing device As directed tid    berberine/herbal complex no.18 (BERBERINE-HERBAL COMB NO.18 ORAL) Take by mouth.    ergocalciferol (Vitamin  D-2) 1.25 MG (65277 UT) capsule Take 1 capsule (50,000 Units) by mouth 1 (one) time per week.    metFORMIN  mg 24 hr tablet Take 2 tablets (1,000 mg) by mouth 2 times a day.    OneTouch Ultra Test strip As directed daily    pravastatin (Pravachol) 40 mg tablet Take 1 tablet (40 mg) by mouth once daily.    vit B complex 100 combo no.2 (B-100 COMPLEX ORAL) B-100 Complex    ferrous sulfate 325 (65 Fe) MG EC tablet Take 65 mg by mouth once daily with breakfast. Do not crush, chew, or split.     No current facility-administered medications on file prior to visit.       Objective:   Vitals:    05/08/24 0915   BP: (!) 127/92   Pulse: 85   Resp: 18       S1-S2 normal, systolic ejection murmur, no rubs or gallops.  JVD normal.  No carotid bruit.  2+ dorsalis pedis pulses bilaterally.  1+ posterior tibial pulses bilaterally.  Clear to auscultation bilaterally    Labs and Imaging:      Latest Ref Rng & Units 7/6/2019     9:02 AM 9/20/2019     8:30 AM 2/28/2020    11:40 AM 1/19/2021    11:52 AM 3/17/2022    11:49 AM 5/31/2023     7:27 AM 4/16/2024     2:31 PM   Electrolytes   Na 133 - 145 mmol/L 141  143  144  147  143  143  144    K 3.4 - 5.1 mmol/L 4.2  3.9  4.3  3.9  4.2  3.8  4.1    Cl 97 - 107 mmol/L 102  105  106  108  105  107  106    CO2 24 - 31 mmol/L 26  27  25  27  26  25  26    Cr 0.40 - 1.60 mg/dL 0.7  0.7  0.7  0.8  0.7  0.8  0.70    Ca 8.5 - 10.4 mg/dL 9.4  9.4  9.5  9.2  9.1  9.1  9.1          Latest Ref Rng & Units 7/6/2019     9:02 AM 3/17/2022    11:49 AM 5/31/2023     7:27 AM 4/16/2024     2:31 PM   CBC   Hb 12.0 - 16.0 g/dL 15.1  12.7  13.0  10.0    Hct 36.0 - 46.0 % 45.7  38.1  40.1  33.3    MCV 80 - 100 fL 82.9  84.1  83.5  79    RDW 11.5 - 14.5 %    16.5          Latest Ref Rng & Units 7/6/2019     9:02 AM 2/28/2020    11:40 AM 1/19/2021    11:52 AM 3/17/2022    11:49 AM 5/31/2023     7:27 AM 4/16/2024     2:31 PM   Lipids   Chol 133 - 200 mg/dL 194  133  154  164  179  171    HDL >50.0 mg/dL 54   60  57  76  68  65.0    LDL 65 - 130 mg/dL 118  51  76  72  96  83    Trig 40 - 150 mg/dL 110  110  107  80  76  115    ALT 5 - 40 U/L 14  13  8  10  9  8    AST 5 - 40 U/L 11  13  13  14  11  12          Latest Ref Rng & Units 7/6/2019     9:02 AM 4/16/2024     2:31 PM   Thyroid   TSH 0.27 - 4.20 mIU/L 2.18  2.02           No data to display                 Lab Results   Component Value Date    HGBA1C 5.7 11/20/2023    HGBA1C 5.6 03/17/2022    HGBA1C 6.0 01/19/2021    ALBUR 12 05/31/2023    ALBUR 12 01/19/2021    ALBUR 12 02/28/2020        The 10-year ASCVD risk score (Roverto ISAAC, et al., 2019) is: 14.3%    Values used to calculate the score:      Age: 68 years      Sex: Female      Is Non- : No      Diabetic: Yes      Tobacco smoker: No      Systolic Blood Pressure: 127 mmHg      Is BP treated: No      HDL Cholesterol: 65 mg/dL      Total Cholesterol: 171 mg/dL     Echocardiogram: No results found for this or any previous visit.     Echocardiogram: No results found for this or any previous visit from the past 1825 days.    Stress Testing: No results found for this or any previous visit from the past 1825 days.    Cardiac Catheterization: No results found for this or any previous visit from the past 1825 days.    Cardiac Scoring: No results found for this or any previous visit from the past 1825 days.    AAA : No results found for this or any previous visit from the past 1825 days.    OTHER: No results found for this or any previous visit from the past 1825 days.        Assessment/Plan:   1. Type 2 diabetes mellitus with retinopathy, without long-term current use of insulin, macular edema presence unspecified, unspecified laterality, unspecified retinopathy severity (Multi)  Referral to Cardiology      2. Heart murmur  Referral to Cardiology    Transthoracic Echo (TTE) Complete      3. Essential hypertension  Referral to Cardiology      4. Arterial calcification  Referral to Vascular Surgery            Jil Solanomila 68 y.o. female who presents to establish care:    1.  Cardiac murmur  2.  Hypertension  3.  Peripheral arterial disease  4.  Type 2 diabetes mellitus    Overall doing well and asymptomatic.    All documented blood pressures appear to be normal thus far.  Had isolated diastolic hypertension on today's visit.  Will hold off on starting antihypertensive at the current time.    Reviewed her hemoglobin A1c and lipid panel.  A1c has been well-controlled since 2020.  Most recent lipid panel was also reasonable despite her having been relatively noncompliant with her pravastatin.  She has resumed it.    Will check an echocardiogram.  No further workup required for her peripheral arterial disease.    Follow-up in 4 to 6 weeks after echocardiogram.        Time Spent: I spent 30 minutes reviewing medical testing, obtaining medical history and counselling and educating on diagnosis and documenting clinical encounter.         ____________________________________________________________  Allen Randall MD

## 2024-05-13 ENCOUNTER — OFFICE VISIT (OUTPATIENT)
Dept: PRIMARY CARE | Facility: CLINIC | Age: 69
End: 2024-05-13
Payer: MEDICARE

## 2024-05-13 VITALS
WEIGHT: 211 LBS | BODY MASS INDEX: 37.38 KG/M2 | OXYGEN SATURATION: 98 % | SYSTOLIC BLOOD PRESSURE: 136 MMHG | DIASTOLIC BLOOD PRESSURE: 78 MMHG | HEART RATE: 67 BPM | TEMPERATURE: 96.7 F

## 2024-05-13 DIAGNOSIS — D50.9 IRON DEFICIENCY ANEMIA, UNSPECIFIED IRON DEFICIENCY ANEMIA TYPE: ICD-10-CM

## 2024-05-13 DIAGNOSIS — M85.89 OSTEOPENIA OF MULTIPLE SITES: Primary | ICD-10-CM

## 2024-05-13 DIAGNOSIS — D22.30 CHANGE IN FACIAL MOLE: ICD-10-CM

## 2024-05-13 PROCEDURE — 99214 OFFICE O/P EST MOD 30 MIN: CPT | Performed by: STUDENT IN AN ORGANIZED HEALTH CARE EDUCATION/TRAINING PROGRAM

## 2024-05-13 PROCEDURE — 1159F MED LIST DOCD IN RCRD: CPT | Performed by: STUDENT IN AN ORGANIZED HEALTH CARE EDUCATION/TRAINING PROGRAM

## 2024-05-13 PROCEDURE — 1126F AMNT PAIN NOTED NONE PRSNT: CPT | Performed by: STUDENT IN AN ORGANIZED HEALTH CARE EDUCATION/TRAINING PROGRAM

## 2024-05-13 PROCEDURE — 3078F DIAST BP <80 MM HG: CPT | Performed by: STUDENT IN AN ORGANIZED HEALTH CARE EDUCATION/TRAINING PROGRAM

## 2024-05-13 PROCEDURE — 3048F LDL-C <100 MG/DL: CPT | Performed by: STUDENT IN AN ORGANIZED HEALTH CARE EDUCATION/TRAINING PROGRAM

## 2024-05-13 PROCEDURE — 3075F SYST BP GE 130 - 139MM HG: CPT | Performed by: STUDENT IN AN ORGANIZED HEALTH CARE EDUCATION/TRAINING PROGRAM

## 2024-05-13 ASSESSMENT — PATIENT HEALTH QUESTIONNAIRE - PHQ9
2. FEELING DOWN, DEPRESSED OR HOPELESS: NOT AT ALL
1. LITTLE INTEREST OR PLEASURE IN DOING THINGS: NOT AT ALL
SUM OF ALL RESPONSES TO PHQ9 QUESTIONS 1 AND 2: 0

## 2024-05-13 ASSESSMENT — ENCOUNTER SYMPTOMS
DEPRESSION: 0
OCCASIONAL FEELINGS OF UNSTEADINESS: 0
LOSS OF SENSATION IN FEET: 0

## 2024-05-13 ASSESSMENT — PAIN SCALES - GENERAL: PAINLEVEL: 0-NO PAIN

## 2024-05-13 NOTE — PROGRESS NOTES
Subjective   Jil Harper is a 68 y.o. female who presents for bone density.    HPI:      Here to discuss recent bone density (DEXA) results.  Osteopenia in all tested areas, borderline osteopenia/osteoporosis in L femur.    FINDINGS:  SPINE L1-L4  Bone Mineral Density: 0.889 g/cm2  T-Score -1.4  Z-Score 0.7  Bone Mineral Density change vs baseline:  Not reported  Bone Mineral Density change vs previous: Not reported      LEFT FEMUR -TOTAL  Bone Mineral Density: 0.683 g/cm2  T-Score -2.1   Z-Score  -0.6  Bone Mineral Density change vs baseline: Not reported  Bone Mineral Density change vs previous: Not reported      LEFT FEMUR -NECK  Bone Mineral Density: 0.582 g/cm2  T-Score -2.4  Z-Score -0.6    L-Spine 4/15/2024  IMPRESSION:  1. Age-indeterminate mild L2 compression fracture.  2. Mild lower lumbar facet arthropathy.    Has not scheduled with ortho spine.  Referral entered on 4/22/2024    Mole concern:  Has mole on face that has recently been changing appearance, agreeable to derm referral.    Anemia    Lab Results   Component Value Date    WBC 5.0 04/16/2024    HGB 10.0 (L) 04/16/2024    HCT 33.3 (L) 04/16/2024    MCV 79 (L) 04/16/2024     04/16/2024             ROS:   Review of systems is essentially negative for all systems except for any identified issues in HPI above.    Objective     /78   Pulse 67   Temp 35.9 °C (96.7 °F)   Wt 95.7 kg (211 lb)   SpO2 98%   BMI 37.38 kg/m²      PHYSICAL EXAM    GENERAL  Well-appearing, pleasant and cooperative.  No acute distress.    HEENT  HEAD:   Facial Mole Normocephalic.  Atraumatic.  EYES:  PERRLA.  No scleral icterus or conjunctival injection.  NECK:  No adenopathy.  No palpable thyroid enlargement or nodules.    THROAT:  Moist oropharynx without tonsillar enlargement or exudates.    LUNGS:    Clear to auscultation bilaterally.  No wheezes, rales, rhonchi.    CARDIAC:  Regular rate and rhythm.  Normal S1S2.  No  murmurs/rubs/gallops.    ABDOMEN:  Soft, non-tender, non-distended.  No hepatosplenomegaly.  Normoactive bowel sounds.    MUSCULOSKELETAL:  No gross abnormalities.   No joint swelling or erythema,.  No spinal or paraspinal tenderness to palpation.    EXTREMITIES:  No LE edema or cyanosis.      NEURO           Alert and oriented x3. No focal deficits.    PSYCH:          Affect appropriate.           Assessment/Plan   Problem List Items Addressed This Visit    None  Visit Diagnoses       Osteopenia of multiple sites    -  Primary    Relevant Orders    Referral to Rheumatology    Change in facial mole        Relevant Orders    Referral to Dermatology    Iron deficiency anemia, unspecified iron deficiency anemia type        Relevant Orders    CBC    Iron and TIBC    Ferritin        Counseling:   Medication education:    Education: The patient is counseled regarding potential side effects of all new medications.    Understanding:  Patient expressed understanding.    Adherence:  No barriers to adherence identified.           Nette Duckworth MD

## 2024-05-13 NOTE — PATIENT INSTRUCTIONS
Thank you for coming to see me today.    Dermatology and rheumatology referrals entered today for skin mole and osteopenia treatment as we discussed.  Call to schedule.    Continue taking daily iron supplement for anemia.  Return to lab next month (mid June) for repeat anemia testing.    Schedule appointment with orthospine specialist for compression fracture as we discussed.    Follow-up with me in 3 months, sooner if needed.

## 2024-05-16 NOTE — PROGRESS NOTES
HPI   69 yo with Diabetes 2 (dx 2019, + retinopathy) b12 def, Dyslipidemia, osteoporosis presents for followup. Last A1c 5.7%, today 5.9%.     Pt is testing sugars 1-2 times per day. Pt is having low sugars 0 times/week. Pt's typical blood sugars 110-120's on waking, after dinner up to 200's . Pt is following a carb controlled diet and knows reasonable carb allowances. Pt is able to afford their medications. Pt is not all that active.           Taking metfomin 500mg er (4), came off trulicity due to cost in july 2020, still expensive at [facility] in 2021/doesn't qualify for pt assistance. Was on trulicity 0.75mg may 2023 visit, pt currently off it due to cost.           Pt back taking pravastatin 40mg for some time.           Pt was taking b12 1mg day, now only taking a B complex supplement.    Dexa 5-1-24: t scores: L spine -1.4, L femur -2.1, L femoral neck -2.4    -pt with iron def anemia, neg cologuard, on iron supplement, following with pcp          Current Outpatient Medications:     ALPHA LIPOIC ACID ORAL, Alpha Lipoic Acid, Disp: , Rfl:     Autolet (OneTouch Delica Plus Lanc Dev) lancing device, As directed tid, Disp: , Rfl:     berberine/herbal complex no.18 (BERBERINE-HERBAL COMB NO.18 ORAL), Take by mouth., Disp: , Rfl:     ergocalciferol (Vitamin D-2) 1.25 MG (64023 UT) capsule, Take 1 capsule (50,000 Units) by mouth 1 (one) time per week., Disp: 12 capsule, Rfl: 0    ferrous sulfate 325 (65 Fe) MG EC tablet, Take 65 mg by mouth once daily with breakfast. Do not crush, chew, or split., Disp: , Rfl:     OneTouch Ultra Test strip, As directed daily, Disp: , Rfl:     pravastatin (Pravachol) 40 mg tablet, Take 1 tablet (40 mg) by mouth once daily., Disp: 90 tablet, Rfl: 3    vit B complex 100 combo no.2 (B-100 COMPLEX ORAL), B-100 Complex, Disp: , Rfl:     metFORMIN  mg 24 hr tablet, Take 2 tablets (1,000 mg) by mouth 2 times a day., Disp: 120 tablet, Rfl: 2      Allergies as of 05/20/2024    (No  Known Allergies)         Review of Systems   Cardiology: Lightheadedness-denies.  Chest pain-denies.  Leg edema-denies.  Palpitations-denies.  Respiratory: Cough-denies. Shortness of breath-denies.  Wheezing-denies.  Gastroenterology: Constipation-denies.  Diarrhea-denies.  Heartburn-denies.  Endocrinology: Cold intolerance-denies.  Heat intolerance-denies.  Sweats-denies.  Neurology: Headache-denies.  Tremor-denies.  Neuropathy in extremities-denies.  Psychology: Low energy-denies.  Irritability-denies.  Sleep disturbances-denies.      /73 (BP Location: Right arm, Patient Position: Sitting)   Pulse 71   Wt 94.8 kg (209 lb)   BMI 37.02 kg/m²       Labs:  Lab Results   Component Value Date    WBC 5.0 04/16/2024    NRBC 0.0 04/16/2024    RBC 4.22 04/16/2024    HGB 10.0 (L) 04/16/2024    HCT 33.3 (L) 04/16/2024     04/16/2024     Lab Results   Component Value Date    CALCIUM 9.1 04/16/2024    AST 12 04/16/2024    ALKPHOS 79 04/16/2024    BILITOT 0.7 04/16/2024    PROT 6.3 04/16/2024    ALBUMIN 4.0 04/16/2024    GLOB 2.5 05/31/2023    AGR 1.6 05/31/2023     04/16/2024    K 4.1 04/16/2024     04/16/2024    CO2 26 04/16/2024    ANIONGAP 12 04/16/2024    BUN 17 04/16/2024    CREATININE 0.70 04/16/2024    UREACREAUR 13.8 05/31/2023    GLUCOSE 93 04/16/2024    ALT 8 04/16/2024    EGFR >90 04/16/2024     Lab Results   Component Value Date    CHOL 171 04/16/2024    TRIG 115 04/16/2024    HDL 65.0 04/16/2024    LDLCALC 83 04/16/2024     Lab Results   Component Value Date    MICROALBCREA 14.0 05/31/2023     Lab Results   Component Value Date    TSH 2.02 04/16/2024     Lab Results   Component Value Date    AVGAUMMT57 425 05/31/2023     Lab Results   Component Value Date    HGBA1C 5.9 05/20/2024         Physical Exam   General Appearance: pleasant, cooperative, no acute distress  HEENT: no chemosis, no proptosis, no lid lag, no lid retraction  Neck: no lymphadenopathy, no thyromegaly, no dominant  thyroid nodules  Heart: no murmurs, regular rate and rhythm, S1 and S2  Lungs: no wheezes, no rhonci, no rales  Extremities: no lower extremity swelling      Assessment/Plan   1. Uncontrolled type 2 diabetes mellitus with hyperglycemia (Multi)  -A1c ordered and reviewed  -glycemic values reviewed  -labs reviewed, check b12/urine micro    -continue to test daily at different times, work on lifestyle    2. Mixed hyperlipidemia  -increase from 40 to 80 mg pravastatin, goal ldl<70    3. Essential hypertension  -at target on therapy    4. Osteoporosis  -dexa scan reviewed, pt with some reflux, avoid oral bisphosphonate  -use reclast 5mg iv, therapy plan entered, reviewed risk/benefits/warnings  -would plan for 3 yrs of therapy and then holiday  -check serum ctx baseline      Follow Up:  Maulik 6 months    -labs/tests/notes reviewed  -reviewed and counseled patient on medication monitoring and side effects    -suggest following up anemia with pcp and consider having GI endoscopy workup

## 2024-05-17 ENCOUNTER — TREATMENT (OUTPATIENT)
Dept: PHYSICAL THERAPY | Facility: CLINIC | Age: 69
End: 2024-05-17
Payer: MEDICARE

## 2024-05-17 DIAGNOSIS — M54.50 CHRONIC BILATERAL LOW BACK PAIN WITHOUT SCIATICA: ICD-10-CM

## 2024-05-17 DIAGNOSIS — G89.29 CHRONIC BILATERAL LOW BACK PAIN WITHOUT SCIATICA: ICD-10-CM

## 2024-05-17 PROCEDURE — 97110 THERAPEUTIC EXERCISES: CPT | Mod: GP,CQ

## 2024-05-17 ASSESSMENT — PAIN SCALES - GENERAL: PAINLEVEL_OUTOF10: 4

## 2024-05-17 NOTE — PROGRESS NOTES
"Physical Therapy Treatment    Patient Name: Jil Harper  MRN: [id number]  Today's Date: 5/17/2024  Time Calculation  Start Time: 1300  Stop Time: 1345  Time Calculation (min): 45 min  PT Therapeutic Procedures Time Entry  Therapeutic Exercise Time Entry: 41    Insurance:  Visit number: 2 of 9  Authorization info: MEDICARE A/B - NO AUTH / [place] VISITS / $0 USED 2024 PT/ST.   MMO  SUPP / 5/2/24  Insurance Type: Payor: MEDICARE / Plan: MEDICARE PART A AND B / Product Type: *No Product type* /     Current Problem   1. Chronic bilateral low back pain without sciatica  Follow Up In Physical Therapy          Subjective   Pt reports LB pain in standing and R knee pain persist.    General   Reason for Referral: Chronic B LB pain without sciatica  Referred By: Nette Wang MD  General Comment: Pt fell at the end of march, she has noticed difficulty going up/down stairs, she also has difficulty with walking more than 10 min without low back pain. She thinks the day she fell she was rushing to get to a chair due to her back. She feels her R knee has been unstable after the fall but she does not report the knee giving out on her since the fall and is no longer having any pain in the knee. She reports the back only hurts when she is standing or walking too long. She does not report any paresthesia but does have history of B foot neuropathy.  Precautions:  Precautions  Precautions Comment: Falls  Pain   Pain Score: 4  Pain Type: Chronic pain  Pain Location: Back  Pain Orientation: Lower  Post Treatment Pain Level 2/10    Objective   Pt requires moderate cueing for core control during ther ex activities.    Treatments:  Therapeutic Exercise:  Therapeutic Exercise  Therapeutic Exercise Performed: Yes  Therapeutic Exercise Activity 1: Nustep L5, 6'  Therapeutic Exercise Activity 2: Seatred hamstring stretches 30\"x3 L/R each  Therapeutic Exercise Activity 3: Seated isometric abdominal brace 2x10  Therapeutic Exercise Activity 4: " Seated isometric abdominal brace with hip adduction 2x10  Therapeutic Exercise Activity 5: Seated isometric abdominal brace with hip abduction PTB  2x10  Therapeutic Exercise Activity 6: Seated isometric abdominal brace with MIP 2x10 L/R each  Therapeutic Exercise Activity 7: Seated isometric abdominal brace with LAQs with 3# 2x10 L/R each 2x10  Therapeutic Exercise Activity 8: Seated isometric abdominal brace with hamstring curls PTB 2x10 L/R each  Therapeutic Exercise Activity 9: Minisquats 2x10     Assessment   Assessment:   PT Assessment  PT Assessment Results: Decreased strength, Decreased range of motion, Decreased endurance, Impaired balance, Pain  Rehab Prognosis: Good  Assessment Comment: Pt demonstrates fair core control during ther ex progressions, tolerates treatment with reduced LB and R knee pain    Plan:   OP PT Plan  Treatment/Interventions: Dry needling, Education/ Instruction, Gait training, Manual therapy, Neuromuscular re-education, Taping techniques, Therapeutic activities, Therapeutic exercises  PT Plan: Skilled PT  PT Frequency: 1 time per week  Duration: 8 weeks + eval  Onset Date: 05/03/24  Certification Period Start Date: 05/03/24  Certification Period End Date: 08/01/24  Number of Treatments Authorized: [place]  Rehab Potential: Good  Plan of Care Agreement: Patient    OP EDUCATION:  Outpatient Education  Individual(s) Educated: Patient  Education Provided: Body Mechanics, Home Exercise Program  Patient/Caregiver Demonstrated Understanding: yes  Patient Response to Education: Patient/Caregiver Verbalized Understanding of Information, Patient/Caregiver Performed Return Demonstration of Exercises/Activities, Patient/Caregiver Asked Appropriate Questions    Goals:   Active       PT Problem - STG       PT Goal 1       Start:  05/03/24    Expected End:  06/12/24       Pt will be 50% IND with HEP in 4 weeks in order to progress with therapy.          PT Goal 2       Start:  05/03/24    Expected End:   06/12/24       Pt will reduce pain levels to no more than 1/10 in 4 weeks in order to improve ambulation.          PT Goal 3       Start:  05/03/24    Expected End:  06/12/24       Pt will be able to perform prolonged standing and ambulation for 20 min in 4 weeks required for grocery shopping, cooking and cleaning..          PT Goal 4       Start:  05/03/24    Expected End:  06/12/24       Pt will demonstrate subjective improvement of ADLs and recreational activities through improved score of 12 on YOSELYN in 4 weeks.             PT Problem _ LTG       PT Goal 1       Start:  05/03/24    Expected End:  08/01/24       Pt will be 100% IND with HEP in 8 weeks in order to maintain progress with therapy.           PT Goal 2       Start:  05/03/24    Expected End:  08/01/24       Pt will be able to perform ascending/descending reciprocal pattern of 4 steps in 8 weeks for home navigation needs.          PT Goal 3       Start:  05/03/24    Expected End:  08/01/24       Pt will demonstrate subjective improvement of ADLs and recreational activities through improved score of 5 on YOSELYN in 8 weeks to perform recreational walking.          PT Goal 4       Start:  05/03/24    Expected End:  08/01/24       Pt will be able to perform prolonged standing/ambulation for 30 min in 8 weeks required for grocery shopping, cooking and clenaing.

## 2024-05-20 ENCOUNTER — OFFICE VISIT (OUTPATIENT)
Dept: ENDOCRINOLOGY | Facility: CLINIC | Age: 69
End: 2024-05-20
Payer: MEDICARE

## 2024-05-20 VITALS
BODY MASS INDEX: 37.02 KG/M2 | WEIGHT: 209 LBS | HEART RATE: 71 BPM | DIASTOLIC BLOOD PRESSURE: 73 MMHG | SYSTOLIC BLOOD PRESSURE: 135 MMHG

## 2024-05-20 DIAGNOSIS — M81.0 OSTEOPOROSIS, UNSPECIFIED OSTEOPOROSIS TYPE, UNSPECIFIED PATHOLOGICAL FRACTURE PRESENCE: ICD-10-CM

## 2024-05-20 DIAGNOSIS — E11.65 UNCONTROLLED TYPE 2 DIABETES MELLITUS WITH HYPERGLYCEMIA (MULTI): Primary | ICD-10-CM

## 2024-05-20 DIAGNOSIS — E78.2 MIXED HYPERLIPIDEMIA: ICD-10-CM

## 2024-05-20 DIAGNOSIS — I10 ESSENTIAL HYPERTENSION: ICD-10-CM

## 2024-05-20 LAB — POC HEMOGLOBIN A1C: 5.9 % (ref 4.2–6.5)

## 2024-05-20 PROCEDURE — 1159F MED LIST DOCD IN RCRD: CPT | Performed by: INTERNAL MEDICINE

## 2024-05-20 PROCEDURE — 83036 HEMOGLOBIN GLYCOSYLATED A1C: CPT | Performed by: INTERNAL MEDICINE

## 2024-05-20 PROCEDURE — 1036F TOBACCO NON-USER: CPT | Performed by: INTERNAL MEDICINE

## 2024-05-20 PROCEDURE — 1125F AMNT PAIN NOTED PAIN PRSNT: CPT | Performed by: INTERNAL MEDICINE

## 2024-05-20 PROCEDURE — 3048F LDL-C <100 MG/DL: CPT | Performed by: INTERNAL MEDICINE

## 2024-05-20 PROCEDURE — 3078F DIAST BP <80 MM HG: CPT | Performed by: INTERNAL MEDICINE

## 2024-05-20 PROCEDURE — 3075F SYST BP GE 130 - 139MM HG: CPT | Performed by: INTERNAL MEDICINE

## 2024-05-20 PROCEDURE — 99214 OFFICE O/P EST MOD 30 MIN: CPT | Performed by: INTERNAL MEDICINE

## 2024-05-20 RX ORDER — PRAVASTATIN SODIUM 80 MG/1
80 TABLET ORAL DAILY
Qty: 90 TABLET | Refills: 3 | Status: SHIPPED | OUTPATIENT
Start: 2024-05-20 | End: 2025-05-20

## 2024-05-20 RX ORDER — ZOLEDRONIC ACID 5 MG/100ML
5 INJECTION, SOLUTION INTRAVENOUS ONCE
OUTPATIENT
Start: 2024-05-21

## 2024-05-20 RX ORDER — FAMOTIDINE 10 MG/ML
20 INJECTION INTRAVENOUS ONCE AS NEEDED
OUTPATIENT
Start: 2024-05-21

## 2024-05-20 RX ORDER — ALBUTEROL SULFATE 0.83 MG/ML
3 SOLUTION RESPIRATORY (INHALATION) AS NEEDED
OUTPATIENT
Start: 2024-05-21

## 2024-05-20 RX ORDER — DIPHENHYDRAMINE HYDROCHLORIDE 50 MG/ML
50 INJECTION INTRAMUSCULAR; INTRAVENOUS AS NEEDED
OUTPATIENT
Start: 2024-05-21

## 2024-05-20 RX ORDER — EPINEPHRINE 0.3 MG/.3ML
0.3 INJECTION SUBCUTANEOUS EVERY 5 MIN PRN
OUTPATIENT
Start: 2024-05-21

## 2024-05-20 ASSESSMENT — PAIN SCALES - GENERAL: PAINLEVEL: 2

## 2024-05-22 ENCOUNTER — TELEPHONE (OUTPATIENT)
Dept: PRIMARY CARE | Facility: CLINIC | Age: 69
End: 2024-05-22
Payer: MEDICARE

## 2024-05-23 ENCOUNTER — DOCUMENTATION (OUTPATIENT)
Dept: INFUSION THERAPY | Facility: CLINIC | Age: 69
End: 2024-05-23
Payer: MEDICARE

## 2024-05-23 ENCOUNTER — TREATMENT (OUTPATIENT)
Dept: PHYSICAL THERAPY | Facility: CLINIC | Age: 69
End: 2024-05-23
Payer: MEDICARE

## 2024-05-23 DIAGNOSIS — G89.29 CHRONIC BILATERAL LOW BACK PAIN WITHOUT SCIATICA: ICD-10-CM

## 2024-05-23 DIAGNOSIS — M54.50 CHRONIC BILATERAL LOW BACK PAIN WITHOUT SCIATICA: ICD-10-CM

## 2024-05-23 PROCEDURE — 97110 THERAPEUTIC EXERCISES: CPT | Mod: GP,CQ

## 2024-05-23 ASSESSMENT — PAIN SCALES - GENERAL: PAINLEVEL_OUTOF10: 1

## 2024-05-23 NOTE — PROGRESS NOTES
Patient to be scheduled for  New Start of Reclast infusions  For Diagnosis: Osteoporosis     Labs… 6/7/2024 - Ca+ 9.0  Lab Results   Component Value Date    CREATININE 0.70 04/16/2024    There were no vitals filed for this visit.  CrCl: 97.88 ml/min     Serum Calcium: 9.1  Lab Results   Component Value Date    CALCIUM 9.1 04/16/2024      Lab Results   Component Value Date    ALBUMIN 4.0 04/16/2024          Calcium and Vitamin D supplement noted on medication list? Yes  (if no nurse to encourage discussion of supplementation at visit)  Current Outpatient Medications on File Prior to Visit   Medication Sig Dispense Refill    ALPHA LIPOIC ACID ORAL Alpha Lipoic Acid      Autolet (OneTouch Delica Plus Lanc Dev) lancing device As directed tid      berberine/herbal complex no.18 (BERBERINE-HERBAL COMB NO.18 ORAL) Take by mouth.      ergocalciferol (Vitamin D-2) 1.25 MG (02259 UT) capsule Take 1 capsule (50,000 Units) by mouth 1 (one) time per week. 12 capsule 0    ferrous sulfate 325 (65 Fe) MG EC tablet Take 65 mg by mouth once daily with breakfast. Do not crush, chew, or split.      metFORMIN  mg 24 hr tablet Take 2 tablets (1,000 mg) by mouth 2 times a day. 120 tablet 2    OneTouch Ultra Test strip As directed daily      pravastatin (Pravachol) 80 mg tablet Take 1 tablet (80 mg) by mouth once daily. 90 tablet 3    vit B complex 100 combo no.2 (B-100 COMPLEX ORAL) B-100 Complex      [DISCONTINUED] pravastatin (Pravachol) 40 mg tablet Take 1 tablet (40 mg) by mouth once daily. 90 tablet 3     No current facility-administered medications on file prior to visit.        Is the patient receiving or have an allergy to Zometa? No  No Known Allergies     No obvious recent dental work per chart review. Nurse to confirm no dental within past/next 4 weeks at encounter.        Last infusion received: New start   Due: 7/8/2024    This result meets treatment criteria. Ok to schedule for reclast within 28 days of the calcium  lab draw date listed above. OR… Ok to schedule for reclast; please instruct pt to have labs drawn no more than 28 days prior to their scheduled appointment

## 2024-05-23 NOTE — PROGRESS NOTES
"Physical Therapy Treatment    Patient Name: Jil Harper  MRN: [id number]  Today's Date: 5/23/2024  Time Calculation  Start Time: 1305  Stop Time: 1347  Time Calculation (min): 42 min  PT Therapeutic Procedures Time Entry  Therapeutic Exercise Time Entry: 41    Insurance:  Visit number: 3 of 9  Authorization info: MEDICARE A/B - NO AUTH / [place]ISITS / $0 USED 2024 PT/ST.   MMO [facility] SUPP / 5/2/24  Insurance Type: Payor: MEDICARE / Plan: MEDICARE PART A AND B / Product Type: *No Product type* /     Current Problem   1. Chronic bilateral low back pain without sciatica  Follow Up In Physical Therapy          Subjective   Pt reports no knee pain on arrival, only LB pain, is able to stand longer and can get down on the ground now.    General   Reason for Referral: Chronic B LB pain without sciatica  Referred By: Nette Wang MD  General Comment: Pt fell at the end of march, she has noticed difficulty going up/down stairs, she also has difficulty with walking more than 10 min without low back pain. She thinks the day she fell she was rushing to get to a chair due to her back. She feels her R knee has been unstable after the fall but she does not report the knee giving out on her since the fall and is no longer having any pain in the knee. She reports the back only hurts when she is standing or walking too long. She does not report any paresthesia but does have history of B foot neuropathy.  Precautions:  Precautions  Precautions Comment: Falls  Pain   Pain Score: 1  Pain Type: Chronic pain  Pain Location: Back  Pain Orientation: Lower  Post Treatment Pain Level 1/10    Objective   Fewer cues required for core contractions during exercises.    Treatments:  Therapeutic Exercise:  Therapeutic Exercise  Therapeutic Exercise Performed: Yes  Therapeutic Exercise Activity 1: Nustep L5, 6'  Therapeutic Exercise Activity 2: Seated hamstring stretches 30\"x3 L/R each  Therapeutic Exercise Activity 3: Seated isometric abdominal " brace 2x10  Therapeutic Exercise Activity 4: Seated isometric abdominal brace with hip adduction 2x10  Therapeutic Exercise Activity 5: Seated isometric abdominal brace with hip abduction PTB  2x10  Therapeutic Exercise Activity 6: Seated isometric abdominal brace with MIP 2x10 L/R each  Therapeutic Exercise Activity 7: Seated isometric abdominal brace with LAQs with 3# 2x10 L/R each 2x10  Therapeutic Exercise Activity 8: Standing knee flexion with abdominal brace3# 2x10 L/R each  Therapeutic Exercise Activity 9: Minisquats 2x10  Therapeutic Exercise Activity 10: Standing PF/DF with abdominal brace 2x10 each         Assessment   Assessment:   PT Assessment  PT Assessment Results: Decreased strength, Decreased range of motion, Decreased endurance, Impaired balance, Pain  Rehab Prognosis: Good  Evaluation/Treatment Tolerance: Patient tolerated treatment well  Assessment Comment: Pt performing ADLs with reduced pain S/S most notably in her R knee.    Plan:   OP PT Plan  Treatment/Interventions: Dry needling, Education/ Instruction, Gait training, Manual therapy, Neuromuscular re-education, Taping techniques, Therapeutic activities, Therapeutic exercises  PT Plan: Skilled PT  PT Frequency: 1 time per week  Duration: 8 weeks + eval  Onset Date: 05/03/24  Certification Period Start Date: 05/03/24  Certification Period End Date: 08/01/24  Number of Treatments Authorized: MN  Rehab Potential: Good  Plan of Care Agreement: Patient    OP EDUCATION:  Outpatient Education  Individual(s) Educated: Patient  Education Provided: Home Exercise Program  Patient/Caregiver Demonstrated Understanding: yes  Patient Response to Education: Patient/Caregiver Verbalized Understanding of Information, Patient/Caregiver Performed Return Demonstration of Exercises/Activities, Patient/Caregiver Asked Appropriate Questions    Goals:   Active       PT Problem - STG       PT Goal 1       Start:  05/03/24    Expected End:  06/12/24       Pt will be  50% IND with HEP in 4 weeks in order to progress with therapy.          PT Goal 2       Start:  05/03/24    Expected End:  06/12/24       Pt will reduce pain levels to no more than 1/10 in 4 weeks in order to improve ambulation.          PT Goal 3       Start:  05/03/24    Expected End:  06/12/24       Pt will be able to perform prolonged standing and ambulation for 20 min in 4 weeks required for grocery shopping, cooking and cleaning..          PT Goal 4       Start:  05/03/24    Expected End:  06/12/24       Pt will demonstrate subjective improvement of ADLs and recreational activities through improved score of 12 on YOSELYN in 4 weeks.             PT Problem _ LTG       PT Goal 1       Start:  05/03/24    Expected End:  08/01/24       Pt will be 100% IND with HEP in 8 weeks in order to maintain progress with therapy.           PT Goal 2       Start:  05/03/24    Expected End:  08/01/24       Pt will be able to perform ascending/descending reciprocal pattern of 4 steps in 8 weeks for home navigation needs.          PT Goal 3       Start:  05/03/24    Expected End:  08/01/24       Pt will demonstrate subjective improvement of ADLs and recreational activities through improved score of 5 on YOSELYN in 8 weeks to perform recreational walking.          PT Goal 4       Start:  05/03/24    Expected End:  08/01/24       Pt will be able to perform prolonged standing/ambulation for 30 min in 8 weeks required for grocery shopping, cooking and clenaing.

## 2024-05-31 ENCOUNTER — APPOINTMENT (OUTPATIENT)
Dept: PHYSICAL THERAPY | Facility: CLINIC | Age: 69
End: 2024-05-31
Payer: MEDICARE

## 2024-06-06 ENCOUNTER — OFFICE VISIT (OUTPATIENT)
Dept: ORTHOPEDIC SURGERY | Facility: CLINIC | Age: 69
End: 2024-06-06
Payer: MEDICARE

## 2024-06-06 ENCOUNTER — HOSPITAL ENCOUNTER (OUTPATIENT)
Dept: RADIOLOGY | Facility: CLINIC | Age: 69
Discharge: HOME | End: 2024-06-06
Payer: MEDICARE

## 2024-06-06 VITALS — WEIGHT: 209 LBS | HEIGHT: 63 IN | BODY MASS INDEX: 37.03 KG/M2

## 2024-06-06 DIAGNOSIS — S32.020A COMPRESSION FRACTURE OF L2 VERTEBRA, INITIAL ENCOUNTER (MULTI): ICD-10-CM

## 2024-06-06 DIAGNOSIS — S32.020A COMPRESSION FRACTURE OF L2 LUMBAR VERTEBRA, CLOSED, INITIAL ENCOUNTER (MULTI): Primary | ICD-10-CM

## 2024-06-06 DIAGNOSIS — M47.816 OSTEOARTHRITIS OF LUMBAR SPINE, UNSPECIFIED SPINAL OSTEOARTHRITIS COMPLICATION STATUS: ICD-10-CM

## 2024-06-06 DIAGNOSIS — S32.020A COMPRESSION FRACTURE OF L2 LUMBAR VERTEBRA, CLOSED, INITIAL ENCOUNTER (MULTI): ICD-10-CM

## 2024-06-06 PROCEDURE — 72100 X-RAY EXAM L-S SPINE 2/3 VWS: CPT | Performed by: RADIOLOGY

## 2024-06-06 PROCEDURE — 99202 OFFICE O/P NEW SF 15 MIN: CPT | Performed by: PHYSICIAN ASSISTANT

## 2024-06-06 PROCEDURE — 3062F POS MACROALBUMINURIA REV: CPT | Performed by: PHYSICIAN ASSISTANT

## 2024-06-06 PROCEDURE — 3048F LDL-C <100 MG/DL: CPT | Performed by: PHYSICIAN ASSISTANT

## 2024-06-06 PROCEDURE — 72100 X-RAY EXAM L-S SPINE 2/3 VWS: CPT

## 2024-06-06 ASSESSMENT — PAIN - FUNCTIONAL ASSESSMENT: PAIN_FUNCTIONAL_ASSESSMENT: 0-10

## 2024-06-07 ENCOUNTER — LAB (OUTPATIENT)
Dept: LAB | Facility: LAB | Age: 69
End: 2024-06-07
Payer: MEDICARE

## 2024-06-07 DIAGNOSIS — D50.9 IRON DEFICIENCY ANEMIA, UNSPECIFIED IRON DEFICIENCY ANEMIA TYPE: ICD-10-CM

## 2024-06-07 DIAGNOSIS — M81.0 OSTEOPOROSIS, UNSPECIFIED OSTEOPOROSIS TYPE, UNSPECIFIED PATHOLOGICAL FRACTURE PRESENCE: ICD-10-CM

## 2024-06-07 DIAGNOSIS — E11.65 UNCONTROLLED TYPE 2 DIABETES MELLITUS WITH HYPERGLYCEMIA (MULTI): ICD-10-CM

## 2024-06-07 LAB
ALBUMIN SERPL-MCNC: 4 G/DL (ref 3.5–5)
ALP BLD-CCNC: 66 U/L (ref 35–125)
ALT SERPL-CCNC: 8 U/L (ref 5–40)
ANION GAP SERPL CALC-SCNC: 13 MMOL/L
AST SERPL-CCNC: 12 U/L (ref 5–40)
BILIRUB SERPL-MCNC: 0.7 MG/DL (ref 0.1–1.2)
BUN SERPL-MCNC: 18 MG/DL (ref 8–25)
CALCIUM SERPL-MCNC: 9 MG/DL (ref 8.5–10.4)
CHLORIDE SERPL-SCNC: 108 MMOL/L (ref 97–107)
CO2 SERPL-SCNC: 23 MMOL/L (ref 24–31)
CREAT SERPL-MCNC: 0.8 MG/DL (ref 0.4–1.6)
CREAT UR-MCNC: 64.5 MG/DL
EGFRCR SERPLBLD CKD-EPI 2021: 80 ML/MIN/1.73M*2
ERYTHROCYTE [DISTWIDTH] IN BLOOD BY AUTOMATED COUNT: 17.2 % (ref 11.5–14.5)
FERRITIN SERPL-MCNC: 9 NG/ML (ref 13–150)
GLUCOSE SERPL-MCNC: 89 MG/DL (ref 65–99)
HCT VFR BLD AUTO: 33.8 % (ref 36–46)
HGB BLD-MCNC: 10.4 G/DL (ref 12–16)
IRON SATN MFR SERPL: 10 % (ref 12–50)
IRON SERPL-MCNC: 39 UG/DL (ref 30–160)
MCH RBC QN AUTO: 23.8 PG (ref 26–34)
MCHC RBC AUTO-ENTMCNC: 30.8 G/DL (ref 32–36)
MCV RBC AUTO: 77 FL (ref 80–100)
MICROALBUMIN UR-MCNC: <12 MG/L (ref 0–23)
MICROALBUMIN/CREAT UR: NORMAL MG/G{CREAT}
NRBC BLD-RTO: 0 /100 WBCS (ref 0–0)
PLATELET # BLD AUTO: 200 X10*3/UL (ref 150–450)
POTASSIUM SERPL-SCNC: 4.4 MMOL/L (ref 3.4–5.1)
PROT SERPL-MCNC: 6.2 G/DL (ref 5.9–7.9)
RBC # BLD AUTO: 4.37 X10*6/UL (ref 4–5.2)
SODIUM SERPL-SCNC: 144 MMOL/L (ref 133–145)
TIBC SERPL-MCNC: 395 UG/DL (ref 228–428)
UIBC SERPL-MCNC: 356 UG/DL (ref 110–370)
VIT B12 SERPL-MCNC: 179 PG/ML (ref 211–946)
WBC # BLD AUTO: 4.6 X10*3/UL (ref 4.4–11.3)

## 2024-06-07 PROCEDURE — 82523 COLLAGEN CROSSLINKS: CPT

## 2024-06-07 PROCEDURE — 82728 ASSAY OF FERRITIN: CPT

## 2024-06-07 PROCEDURE — 80053 COMPREHEN METABOLIC PANEL: CPT

## 2024-06-07 PROCEDURE — 83540 ASSAY OF IRON: CPT

## 2024-06-07 PROCEDURE — 82043 UR ALBUMIN QUANTITATIVE: CPT

## 2024-06-07 PROCEDURE — 82607 VITAMIN B-12: CPT

## 2024-06-07 PROCEDURE — 83550 IRON BINDING TEST: CPT

## 2024-06-07 PROCEDURE — 36415 COLL VENOUS BLD VENIPUNCTURE: CPT

## 2024-06-07 PROCEDURE — 85027 COMPLETE CBC AUTOMATED: CPT

## 2024-06-07 PROCEDURE — 82570 ASSAY OF URINE CREATININE: CPT

## 2024-06-10 LAB — COLLAGEN CTX SERPL-MCNC: 700 PG/ML

## 2024-06-10 NOTE — PROGRESS NOTES
"HPI:  Jil Harper is a 68 y.o. female who comes into the spine clinic for evaluation of chronic low back pain.     She recalls 2 instances of inciting back pain in the past. The first was a \"couple years ago\" when she lifted her mother up and felt an acute \"pop\" in her back. This pain resolved without intervention. She additionally recalls a fall while at the mall more recently on 5/26/24 resulting in knee and low back pain - the knee pain resolved however the back pain has lingered.     Reports sensation of low back \"strain\" and aching/pressure. Pain is intermittent and worsens after ambulating more than 10 minutes. The pain frequently stops her from standing/walking long periods of time and improves with sitting.   Denies leg pain/numbness/tingling. Denies weakness in the legs or bowel/bladder dysfunction.     She completed 3 sessions of outpatient PT and has been keeping up with the exercises at home. She is working on weight loss currently.     She is a diabetic.     No current medications for her low back pain. No injections in the back.     ROS:  Reviewed on EMR and patient intake sheet.    PMH/SH:  Reviewed on EMR and patient intake sheet.    Exam:  Physical Exam  Spine Musculoskeletal Exam    Well appearing, NAD  Pleasant & cooperative  BMI 37.02  Decreased ROM lumbar spine with rotation, flexion/extension  No paraspinal muscle spasms  lower extremity sensation intact  lower extremity motor 5/5 throughout  normal gait & station  Neg SLR bl  + TTP right hip    Radiology:     Xrays lumbar spine done in the office today 06/03/24 personally reviewed and compared to prior imaging dated 04/15/24. Stable L2 compression fracture unchanged. No scoliosis or spondylolisthesis. Disc spaces well maintained. Mild facet arthropathy L4-5, L5-S1.       Assessment and Plan:  1. Compression fracture of L2 lumbar vertebra, closed, initial encounter (Multi)  - XR lumbar spine 2-3 views; Future    2. Osteoarthritis of lumbar " spine, unspecified spinal osteoarthritis complication status    I reviewed the imaging studies with Jil Harper in detail today. Suspect this is an old L2 compression fracture that is well healed.     Patient with low back pain that does not radiate into the lower extremities. We discussed the pathology and natural course of spondylotic changes in the spine. I educated the patient on several lifestyle modifications that include increased walking, weight management, smoking cessation, and a routine exercise plan that includes core strengthening. The patient was educated that this pain may fluctuate over time. I recommended OTC Tylenol/NSAIDs for pain relief.      I encouraged her to continue with PT and weight management.     Patient in agreement to plan of care. Of course Jil Harper is welcome to call at anytime with questions or concerns.     Annetta Lam PA-C  Department of Orthopaedic Surgery    [address] [place] [address]    Voicemail: [phone]   Appts: [phone]  Fax: [phone]

## 2024-06-13 ENCOUNTER — PATIENT MESSAGE (OUTPATIENT)
Dept: PRIMARY CARE | Facility: CLINIC | Age: 69
End: 2024-06-13
Payer: MEDICARE

## 2024-06-13 DIAGNOSIS — G89.29 CHRONIC BILATERAL LOW BACK PAIN WITHOUT SCIATICA: Primary | ICD-10-CM

## 2024-06-13 DIAGNOSIS — M81.0 OSTEOPOROSIS, UNSPECIFIED OSTEOPOROSIS TYPE, UNSPECIFIED PATHOLOGICAL FRACTURE PRESENCE: ICD-10-CM

## 2024-06-13 DIAGNOSIS — M54.50 CHRONIC BILATERAL LOW BACK PAIN WITHOUT SCIATICA: Primary | ICD-10-CM

## 2024-06-13 DIAGNOSIS — S32.020G CLOSED COMPRESSION FRACTURE OF L2 LUMBAR VERTEBRA WITH DELAYED HEALING, SUBSEQUENT ENCOUNTER: ICD-10-CM

## 2024-06-13 DIAGNOSIS — E11.65 TYPE 2 DIABETES MELLITUS WITH HYPERGLYCEMIA (MULTI): ICD-10-CM

## 2024-06-13 RX ORDER — BLOOD SUGAR DIAGNOSTIC
STRIP MISCELLANEOUS
Qty: 100 STRIP | Refills: 3 | Status: SHIPPED | OUTPATIENT
Start: 2024-06-13 | End: 2024-06-14 | Stop reason: SDUPTHER

## 2024-06-14 DIAGNOSIS — E11.65 TYPE 2 DIABETES MELLITUS WITH HYPERGLYCEMIA (MULTI): ICD-10-CM

## 2024-06-14 RX ORDER — BLOOD SUGAR DIAGNOSTIC
STRIP MISCELLANEOUS
Qty: 200 STRIP | Refills: 3 | Status: SHIPPED | OUTPATIENT
Start: 2024-06-14 | End: 2024-06-14 | Stop reason: SDUPTHER

## 2024-06-14 RX ORDER — BLOOD SUGAR DIAGNOSTIC
STRIP MISCELLANEOUS
Qty: 100 STRIP | Refills: 3 | Status: SHIPPED | OUTPATIENT
Start: 2024-06-14

## 2024-06-14 NOTE — TELEPHONE ENCOUNTER
Refill of test strips. Test once a day per patient.   New Rx needs sent over for once a day not twice a day.     Rx pending to be sent to [facility].     Jen

## 2024-06-14 NOTE — TELEPHONE ENCOUNTER
From: Jil Harper  To: Nette Duckworth  Sent: 6/13/2024 2:40 PM EDT  Subject: Handicap placard    Can I get handicap placard for ca

## 2024-06-17 NOTE — PATIENT COMMUNICATION
Disability placard Rx printed and signed -on Floresita's desk.  Please notify patient and mail or make available for pickup per her preference.

## 2024-06-19 ENCOUNTER — APPOINTMENT (OUTPATIENT)
Dept: CARDIOLOGY | Facility: CLINIC | Age: 69
End: 2024-06-19
Payer: MEDICARE

## 2024-06-19 ENCOUNTER — HOSPITAL ENCOUNTER (OUTPATIENT)
Dept: CARDIOLOGY | Facility: HOSPITAL | Age: 69
Discharge: HOME | End: 2024-06-19
Payer: MEDICARE

## 2024-06-19 DIAGNOSIS — R01.1 HEART MURMUR: ICD-10-CM

## 2024-06-19 LAB
AORTIC VALVE MEAN GRADIENT: 14.6 MMHG
AORTIC VALVE PEAK VELOCITY: 2.48 M/S
AV PEAK GRADIENT: 24.5 MMHG
AVA (PEAK VEL): 1.79 CM2
AVA (VTI): 1.88 CM2
EJECTION FRACTION APICAL 4 CHAMBER: 53.6
GLOBAL LONGITUDINAL STRAIN: 18.1 %
LEFT ATRIUM VOLUME AREA LENGTH INDEX BSA: 35.4 ML/M2
LEFT VENTRICLE INTERNAL DIMENSION DIASTOLE: 5.13 CM (ref 3.5–6)
LEFT VENTRICULAR OUTFLOW TRACT DIAMETER: 2.03 CM
LV EJECTION FRACTION BIPLANE: 57 %
MITRAL VALVE E/A RATIO: 0.94
MITRAL VALVE E/E' RATIO: 16.33
RIGHT VENTRICLE FREE WALL PEAK S': 11.33 CM/S
RIGHT VENTRICLE PEAK SYSTOLIC PRESSURE: 34.9 MMHG
TRICUSPID ANNULAR PLANE SYSTOLIC EXCURSION: 1.8 CM

## 2024-06-19 PROCEDURE — 93356 MYOCRD STRAIN IMG SPCKL TRCK: CPT | Performed by: INTERNAL MEDICINE

## 2024-06-19 PROCEDURE — 76376 3D RENDER W/INTRP POSTPROCES: CPT

## 2024-06-19 PROCEDURE — 76376 3D RENDER W/INTRP POSTPROCES: CPT | Performed by: INTERNAL MEDICINE

## 2024-06-19 PROCEDURE — 93306 TTE W/DOPPLER COMPLETE: CPT | Performed by: INTERNAL MEDICINE

## 2024-06-21 ENCOUNTER — TELEMEDICINE (OUTPATIENT)
Dept: CARDIOLOGY | Facility: HOSPITAL | Age: 69
End: 2024-06-21
Payer: MEDICARE

## 2024-06-21 DIAGNOSIS — I35.0 AORTIC STENOSIS: Primary | ICD-10-CM

## 2024-06-21 PROCEDURE — 3048F LDL-C <100 MG/DL: CPT | Performed by: INTERNAL MEDICINE

## 2024-06-21 PROCEDURE — 99213 OFFICE O/P EST LOW 20 MIN: CPT | Performed by: INTERNAL MEDICINE

## 2024-06-21 PROCEDURE — 1159F MED LIST DOCD IN RCRD: CPT | Performed by: INTERNAL MEDICINE

## 2024-06-21 PROCEDURE — 3062F POS MACROALBUMINURIA REV: CPT | Performed by: INTERNAL MEDICINE

## 2024-06-21 PROCEDURE — 1036F TOBACCO NON-USER: CPT | Performed by: INTERNAL MEDICINE

## 2024-06-21 NOTE — PROGRESS NOTES
Primary Care Physician: Nette Duckworth MD   Date of Visit: 06/21/2024 11:30 AM EDT  Type of Visit: New patient visit    Chief Complaint:  Chief Complaint   Patient presents with    Follow-up     Phone call.  ECHO result.        HPI  Jil Harper 68 y.o. female who presents for follow-up.    Overall doing well.  Reviewed findings of her recent echocardiogram.    No family history of early coronary artery disease or stroke.  Brother with significant peripheral arterial disease.    Review of Systems   12 point review of systems was obtained in detail and is negative other than that noted above or below.       Past Medical/Surgical History:  Jil has a past medical history of Abnormal ECG, B12 deficiency, Heart murmur (2021), Hypertension, Mixed hyperlipidemia, and Uncontrolled type 2 diabetes mellitus with hyperglycemia (Multi).    Jil has a past surgical history that includes Leg Surgery; Tonsillectomy (Bilateral); and Cataract extraction (2018).    Social/Family History:  Jil reports that she has never smoked. She has never used smokeless tobacco. She reports that she does not drink alcohol and does not use drugs.    Jil's family history includes Cancer in her father; Diabetes in her mother and paternal grandmother; Diabetes type II in her mother; asbestos of the lung in her father.    Allergies:  No Known Allergies    Medications:  Current Outpatient Medications on File Prior to Visit   Medication Sig    ALPHA LIPOIC ACID ORAL Alpha Lipoic Acid    Autolet (OneTouch Delica Plus Lanc Dev) lancing device As directed tid    berberine/herbal complex no.18 (BERBERINE-HERBAL COMB NO.18 ORAL) Take by mouth.    blood sugar diagnostic (OneTouch Ultra Test) strip As directed test once a day    ergocalciferol (Vitamin D-2) 1.25 MG (42715 UT) capsule Take 1 capsule (50,000 Units) by mouth 1 (one) time per week.    ferrous sulfate 325 (65 Fe) MG EC tablet Take 65 mg by mouth once daily with breakfast. Do not  crush, chew, or split.    pravastatin (Pravachol) 80 mg tablet Take 1 tablet (80 mg) by mouth once daily.    vit B complex 100 combo no.2 (B-100 COMPLEX ORAL) B-100 Complex    metFORMIN  mg 24 hr tablet Take 2 tablets (1,000 mg) by mouth 2 times a day.     No current facility-administered medications on file prior to visit.       Objective:   There were no vitals filed for this visit.      S1-S2 normal, systolic ejection murmur, no rubs or gallops.  JVD normal.  No carotid bruit.  2+ dorsalis pedis pulses bilaterally.  1+ posterior tibial pulses bilaterally.  Clear to auscultation bilaterally    Labs and Imaging:      Latest Ref Rng & Units 9/20/2019     8:30 AM 2/28/2020    11:40 AM 1/19/2021    11:52 AM 3/17/2022    11:49 AM 5/31/2023     7:27 AM 4/16/2024     2:31 PM 6/7/2024     1:29 PM   Electrolytes   Na 133 - 145 mmol/L 143  144  147  143  143  144  144    K 3.4 - 5.1 mmol/L 3.9  4.3  3.9  4.2  3.8  4.1  4.4    Cl 97 - 107 mmol/L 105  106  108  105  107  106  108    CO2 24 - 31 mmol/L 27  25  27  26  25  26  23    Cr 0.40 - 1.60 mg/dL 0.7  0.7  0.8  0.7  0.8  0.70  0.80    Ca 8.5 - 10.4 mg/dL 9.4  9.5  9.2  9.1  9.1  9.1  9.0          Latest Ref Rng & Units 7/6/2019     9:02 AM 3/17/2022    11:49 AM 5/31/2023     7:27 AM 4/16/2024     2:31 PM 6/7/2024     1:29 PM   CBC   Hb 12.0 - 16.0 g/dL 15.1  12.7  13.0  10.0  10.4    Hct 36.0 - 46.0 % 45.7  38.1  40.1  33.3  33.8    MCV 80 - 100 fL 82.9  84.1  83.5  79  77    RDW 11.5 - 14.5 %    16.5  17.2          Latest Ref Rng & Units 7/6/2019     9:02 AM 2/28/2020    11:40 AM 1/19/2021    11:52 AM 3/17/2022    11:49 AM 5/31/2023     7:27 AM 4/16/2024     2:31 PM 6/7/2024     1:29 PM   Lipids   Chol 133 - 200 mg/dL 194  133  154  164  179  171     HDL >50.0 mg/dL 54  60  57  76  68  65.0     LDL 65 - 130 mg/dL 118  51  76  72  96  83     Trig 40 - 150 mg/dL 110  110  107  80  76  115     ALT 5 - 40 U/L 14  13  8  10  9  8  8    AST 5 - 40 U/L 11  13  13  14  11   12  12          Latest Ref Rng & Units 7/6/2019     9:02 AM 4/16/2024     2:31 PM   Thyroid   TSH 0.27 - 4.20 mIU/L 2.18  2.02           No data to display                 Lab Results   Component Value Date    HGBA1C 5.9 05/20/2024    HGBA1C 5.7 11/20/2023    HGBA1C 5.6 03/17/2022    ALBUR 12 05/31/2023    ALBUR 12 01/19/2021    ALBUR 12 02/28/2020        The 10-year ASCVD risk score (Roverto ISAAC, et al., 2019) is: 16%    Values used to calculate the score:      Age: 68 years      Sex: Female      Is Non- : No      Diabetic: Yes      Tobacco smoker: No      Systolic Blood Pressure: 135 mmHg      Is BP treated: No      HDL Cholesterol: 65 mg/dL      Total Cholesterol: 171 mg/dL     Echocardiogram: No results found for this or any previous visit.     Echocardiogram: No results found for this or any previous visit from the past 1825 days.    Stress Testing: No results found for this or any previous visit from the past 1825 days.    Cardiac Catheterization: No results found for this or any previous visit from the past 1825 days.    Cardiac Scoring: No results found for this or any previous visit from the past 1825 days.    AAA : No results found for this or any previous visit from the past 1825 days.    OTHER: No results found for this or any previous visit from the past 1825 days.        Assessment/Plan:   No diagnosis found.       Jil Harper 68 y.o. female who presents to establish care:    1.  Mild aortic stenosis  2.  Hypertension  3.  Peripheral arterial disease  4.  Type 2 diabetes mellitus    Reviewed the findings of her echocardiogram-mild aortic stenosis.    Follow-up in clinic in 1 year with echocardiograms every 3 to 5 years.      ____________________________________________________________  Allen Randall MD

## 2024-07-08 ENCOUNTER — APPOINTMENT (OUTPATIENT)
Dept: INFUSION THERAPY | Facility: CLINIC | Age: 69
End: 2024-07-08
Payer: MEDICARE

## 2024-07-08 VITALS
RESPIRATION RATE: 16 BRPM | TEMPERATURE: 98 F | BODY MASS INDEX: 37.73 KG/M2 | SYSTOLIC BLOOD PRESSURE: 123 MMHG | WEIGHT: 213 LBS | OXYGEN SATURATION: 98 % | DIASTOLIC BLOOD PRESSURE: 72 MMHG | HEART RATE: 76 BPM

## 2024-07-08 DIAGNOSIS — M81.0 OSTEOPOROSIS, UNSPECIFIED OSTEOPOROSIS TYPE, UNSPECIFIED PATHOLOGICAL FRACTURE PRESENCE: ICD-10-CM

## 2024-07-08 PROCEDURE — 96365 THER/PROPH/DIAG IV INF INIT: CPT | Performed by: NURSE PRACTITIONER

## 2024-07-08 RX ORDER — EPINEPHRINE 0.3 MG/.3ML
0.3 INJECTION SUBCUTANEOUS EVERY 5 MIN PRN
OUTPATIENT
Start: 2024-07-08

## 2024-07-08 RX ORDER — DIPHENHYDRAMINE HYDROCHLORIDE 50 MG/ML
50 INJECTION INTRAMUSCULAR; INTRAVENOUS AS NEEDED
OUTPATIENT
Start: 2024-07-08

## 2024-07-08 RX ORDER — ZOLEDRONIC ACID 5 MG/100ML
5 INJECTION, SOLUTION INTRAVENOUS ONCE
Status: CANCELLED | OUTPATIENT
Start: 2024-07-08

## 2024-07-08 RX ORDER — ALBUTEROL SULFATE 0.83 MG/ML
3 SOLUTION RESPIRATORY (INHALATION) AS NEEDED
OUTPATIENT
Start: 2024-07-08

## 2024-07-08 RX ORDER — FAMOTIDINE 10 MG/ML
20 INJECTION INTRAVENOUS ONCE AS NEEDED
OUTPATIENT
Start: 2024-07-08

## 2024-07-08 RX ORDER — ZOLEDRONIC ACID 5 MG/100ML
5 INJECTION, SOLUTION INTRAVENOUS ONCE
Status: COMPLETED | OUTPATIENT
Start: 2024-07-08 | End: 2024-07-08

## 2024-07-08 ASSESSMENT — ENCOUNTER SYMPTOMS: ARTHRALGIAS: 1

## 2024-07-08 ASSESSMENT — PAIN SCALES - GENERAL: PAINLEVEL: 0-NO PAIN

## 2024-07-08 NOTE — PROGRESS NOTES
[facility]   Infusion Clinic Note   Date: 2024   Name: Jil Harper  : 1955   MRN: [id number]         Reason for Visit: OP Infusion (Reclast)         Today: We administered zoledronic acid.       Visit Type: INFUSION- New Start       Ordered By: HEMANT Willingham      Accompanied by:Self      Diagnosis: Osteoporosis, unspecified osteoporosis type, unspecified pathological fracture presence       Allergies:   Allergies as of 2024    (No Known Allergies)         Current Medications has a current medication list which includes the following prescription(s): alpha lipoic acid, autolet, berberine/herbal complex no.18, onetouch ultra test, ergocalciferol, ferrous sulfate, metformin xr, pravastatin, and vit b complex 100 combo no.2.       Vitals:   Vitals:    24 1407 24 1436   BP: 130/79 123/72   Pulse: 79 76   Resp: 18 16   Temp: 36.4 °C (97.5 °F) 36.7 °C (98 °F)   TempSrc: Temporal    SpO2: 98%    Weight: 96.6 kg (213 lb)    PainSc: 0-No pain              Infusion Pre-procedure Checklist:   - Allergies reviewed: yes   - Medications reviewed: yes       - Previous reaction to current treatment: no      Assess patient for the concerns below. Document provider notification as appropriate.  - Active or recent infection with/without current antibiotic use: no  - Recent or planned invasive dental work: no  - Recent or planned surgeries: no  - Recently received or plans to receive vaccinations: no  - Has treatment related toxicities: no  - Is pregnant:  no      Pain: 0   - Is the pain different from normal: no   - Is the pain tolerable: n/a   - Is your Doctor aware:  n/a      Labs:  reviewed results from 2024 Ca 9.0 Creatinine 0.70 and CrCl 97.88         Fall Risk Screening: Arabella Fall Risk  History of Falling, Immediate or Within 3 Months: No  Secondary Diagnosis: Yes  Ambulatory Aid: Walks without aid/bedrest/nurse assist  Intravenous Therapy/Heparin Lock:  "No  Gait/Transferring: Normal/bedrest/immobile  Mental Status: Oriented to own ability  Bell Fall Risk Score: 15       Review Of Systems:  Review of Systems   Musculoskeletal:  Positive for arthralgias.   All other systems reviewed and are negative.        Infusion Readiness:   - Assessment Concerns Related to Infusion: No  - Provider notified: no      Document Below Only If Indicated:   New Patient Education:    N/A (returning patient for continuation of therapy. Ongoing education provided as needed.)        Treatment Conditions & Drug Specific Questions:    Zoledronic Acid  (RECLAST)    (Unless otherwise specified on patient specific therapy plan):     TREATMENT CONDITIONS:  Unless otherwise specified on patient specific therapy plan HOLD and notify provider prior to proceeding with treatment if:   o Creatinine clearance LESS THAN 35 mL/Minute  o Corrected serum calcium LESS THAN 8.6 mg/dL   OR   Ionized calcium LESS THAN 1.1 mmol  o Recent (within 4 weeks) or planned invasive dental procedure.    Lab Results   Component Value Date    CREATININE 0.80 06/07/2024      Lab Results   Component Value Date    CALCIUM 9.0 06/07/2024      No results found for: \"CAION\"    CrCl: 97.88    Labs reviewed and patient meets treatment conditions? Yes    DRUG SPECIFIC QUESTIONS:  Is the patient taking a Calcium and Vitamin D supplement?  Yes  (Recommended)    Is the patient receiving Zometa or do they have an allergy to Zometa?  No    Is the patient aware of the adverse effects which may include bone fractures, hypocalcemia, influenza-like illness, musculoskeletal pain, ocular inflammation, and osteonecrosis of the jaw? No      REMINDERS:  PREGNANCY CATEGORY X DRUG. OBTAIN NEGATIVE PREGNANCY TEST PRIOR TO FIRST INFUSION FOR WOMEN OF CHILDBEARING ABILITY     Recommended Vitals/Observation:  Monitor vital signs before infusion, at the end of the infusion and as needed        Weight Based Drug Calculations:    WEIGHT BASED DRUGS: " NOT APPLICABLE / FLAT DOSE      1445 Patient tolerated infusion well.  No s/s adverse reaction noted. VSS.   20 minute post infusion observation complete.  RTC in July 2025.      Initiated By: Jennifer Tanner RN

## 2024-07-08 NOTE — PATIENT INSTRUCTIONS
Today :We administered zoledronic acid.     For:   1. Osteoporosis, unspecified osteoporosis type, unspecified pathological fracture presence         Your next appointment is due in:  July 2025        Please read the  Medication Guide that was given to you and reviewed during todays visit.     (Tell all doctors including dentists that you are taking this medication)     Go to the emergency room or call 911 if:  -You have signs of allergic reaction:   -Rash, hives, itching.   -Swollen, blistered, peeling skin.   -Swelling of face, lips, mouth, tongue or throat.   -Tightness of chest, trouble breathing, swallowing or talking     Call your doctor:  - If IV / injection site gets red, warm, swollen, itchy or leaks fluid or pus.     (Leave dressing on your IV site for at least 2 hours and keep area clean and dry  - If you get sick or have symptoms of infection or are not feeling well for any reason.    (Wash your hands often, stay away from people who are sick)  - If you have side effects from your medication that do not go away or are bothersome.     (Refer to the teaching your nurse gave you for side effects to call your doctor about)    - Common side effects may include:  stuffy nose, headache, feeling tired, muscle aches, upset stomach  - Before receiving any vaccines     - Call the [facility] at   If:  - You get sick, are on antibiotics, have had a recent vaccine, have surgery or dental work and your doctor wants your visit rescheduled.  - You need to cancel and reschedule your visit for any reason. Call at least 2 days before your visit if you need to cancel.   - Your insurance changes before your next visit.    (We will need to get approval from your new insurance. This can take up to two weeks.)     The [facility] is opened Monday thru Friday. We are closed on weekends and holidays.   Voice mail will take your call if the center is closed. If you leave a message please allow 24  hours for a call back during weekdays. If you leave a message on a weekend/holiday, we will call you back the next business day.

## 2024-07-09 DIAGNOSIS — E11.65 UNCONTROLLED TYPE 2 DIABETES MELLITUS WITH HYPERGLYCEMIA (MULTI): ICD-10-CM

## 2024-07-09 NOTE — TELEPHONE ENCOUNTER
Patient calling to see if you want her on vitamin d her PCP gave her some in April but she wants you to take care of this.

## 2024-07-10 RX ORDER — METFORMIN HYDROCHLORIDE 500 MG/1
1000 TABLET, EXTENDED RELEASE ORAL 2 TIMES DAILY
Qty: 360 TABLET | Refills: 2 | Status: SHIPPED | OUTPATIENT
Start: 2024-07-10

## 2024-07-29 ENCOUNTER — DOCUMENTATION (OUTPATIENT)
Dept: PHYSICAL THERAPY | Facility: CLINIC | Age: 69
End: 2024-07-29
Payer: MEDICARE

## 2024-07-29 NOTE — PROGRESS NOTES
Physical Therapy    Discharge Summary    Name: Jil Harper  MRN: [id number]  : 1955  Date: 24    Discharge Summary: PT    Discharge Information: Date of discharge 24, Date of last visit 24, and Date of evaluation 5/3/24    Therapy Summary: Pt only attended two follow up sessions, cx remaining appointment and failed to return.     Discharge Status: goals not met     Rehab Discharge Reason: Failed to schedule and/or keep follow-up appointment(s)

## 2024-08-01 ENCOUNTER — TELEPHONE (OUTPATIENT)
Dept: ENDOCRINOLOGY | Facility: CLINIC | Age: 69
End: 2024-08-01
Payer: MEDICARE

## 2024-08-01 NOTE — TELEPHONE ENCOUNTER
DARBY On  requesting patient return call to confirm change in time regarding her 11/20/2024 appointment.     Thank you   Jen

## 2024-08-08 ENCOUNTER — DOCUMENTATION (OUTPATIENT)
Dept: PRIMARY CARE | Facility: CLINIC | Age: 69
End: 2024-08-08
Payer: MEDICARE

## 2024-08-08 NOTE — PROGRESS NOTES
Pt left VM requesting Derm referral PCP placed be sent over to [facility] in [place]. Fax # [phone]. This nurse just spoke with pt and informed her it was done but if she goes to appt tomorrow and there are any issues just call the office here and we can resend it.

## 2024-08-29 ENCOUNTER — OFFICE VISIT (OUTPATIENT)
Dept: HEMATOLOGY/ONCOLOGY | Facility: CLINIC | Age: 69
End: 2024-08-29
Payer: MEDICARE

## 2024-08-29 VITALS
SYSTOLIC BLOOD PRESSURE: 143 MMHG | DIASTOLIC BLOOD PRESSURE: 84 MMHG | WEIGHT: 214.51 LBS | OXYGEN SATURATION: 97 % | RESPIRATION RATE: 16 BRPM | TEMPERATURE: 97.3 F | HEIGHT: 62 IN | HEART RATE: 85 BPM | BODY MASS INDEX: 39.47 KG/M2

## 2024-08-29 DIAGNOSIS — D50.9 IRON DEFICIENCY ANEMIA, UNSPECIFIED IRON DEFICIENCY ANEMIA TYPE: ICD-10-CM

## 2024-08-29 DIAGNOSIS — E53.8 VITAMIN B12 DEFICIENCY: ICD-10-CM

## 2024-08-29 DIAGNOSIS — D50.8 IRON DEFICIENCY ANEMIA SECONDARY TO INADEQUATE DIETARY IRON INTAKE: Primary | ICD-10-CM

## 2024-08-29 PROCEDURE — 99204 OFFICE O/P NEW MOD 45 MIN: CPT | Performed by: INTERNAL MEDICINE

## 2024-08-29 PROCEDURE — 3062F POS MACROALBUMINURIA REV: CPT | Performed by: INTERNAL MEDICINE

## 2024-08-29 PROCEDURE — 1159F MED LIST DOCD IN RCRD: CPT | Performed by: INTERNAL MEDICINE

## 2024-08-29 PROCEDURE — 1126F AMNT PAIN NOTED NONE PRSNT: CPT | Performed by: INTERNAL MEDICINE

## 2024-08-29 PROCEDURE — 3048F LDL-C <100 MG/DL: CPT | Performed by: INTERNAL MEDICINE

## 2024-08-29 PROCEDURE — 3079F DIAST BP 80-89 MM HG: CPT | Performed by: INTERNAL MEDICINE

## 2024-08-29 PROCEDURE — 1036F TOBACCO NON-USER: CPT | Performed by: INTERNAL MEDICINE

## 2024-08-29 PROCEDURE — 3008F BODY MASS INDEX DOCD: CPT | Performed by: INTERNAL MEDICINE

## 2024-08-29 PROCEDURE — 1160F RVW MEDS BY RX/DR IN RCRD: CPT | Performed by: INTERNAL MEDICINE

## 2024-08-29 PROCEDURE — 3077F SYST BP >= 140 MM HG: CPT | Performed by: INTERNAL MEDICINE

## 2024-08-29 PROCEDURE — 99214 OFFICE O/P EST MOD 30 MIN: CPT | Performed by: INTERNAL MEDICINE

## 2024-08-29 RX ORDER — HEPARIN 100 UNIT/ML
500 SYRINGE INTRAVENOUS AS NEEDED
OUTPATIENT
Start: 2024-09-12

## 2024-08-29 RX ORDER — ALBUTEROL SULFATE 0.83 MG/ML
3 SOLUTION RESPIRATORY (INHALATION) AS NEEDED
OUTPATIENT
Start: 2024-09-12

## 2024-08-29 RX ORDER — HEPARIN SODIUM,PORCINE/PF 10 UNIT/ML
50 SYRINGE (ML) INTRAVENOUS AS NEEDED
OUTPATIENT
Start: 2024-09-12

## 2024-08-29 RX ORDER — EPINEPHRINE 0.3 MG/.3ML
0.3 INJECTION SUBCUTANEOUS EVERY 5 MIN PRN
OUTPATIENT
Start: 2024-09-12

## 2024-08-29 RX ORDER — DIPHENHYDRAMINE HYDROCHLORIDE 50 MG/ML
50 INJECTION INTRAMUSCULAR; INTRAVENOUS AS NEEDED
OUTPATIENT
Start: 2024-09-12

## 2024-08-29 RX ORDER — CYANOCOBALAMIN 1000 UG/ML
1000 INJECTION, SOLUTION INTRAMUSCULAR; SUBCUTANEOUS
Qty: 12 ML | Refills: 0 | Status: SHIPPED | OUTPATIENT
Start: 2024-08-29

## 2024-08-29 RX ORDER — SYRINGE W-NEEDLE,DISPOSAB,3 ML 25GX5/8"
12 SYRINGE, EMPTY DISPOSABLE MISCELLANEOUS
Qty: 12 EACH | Refills: 0 | Status: SHIPPED | OUTPATIENT
Start: 2024-08-29

## 2024-08-29 RX ORDER — FAMOTIDINE 10 MG/ML
20 INJECTION INTRAVENOUS ONCE AS NEEDED
OUTPATIENT
Start: 2024-09-12

## 2024-08-29 ASSESSMENT — COLUMBIA-SUICIDE SEVERITY RATING SCALE - C-SSRS
2. HAVE YOU ACTUALLY HAD ANY THOUGHTS OF KILLING YOURSELF?: NO
6. HAVE YOU EVER DONE ANYTHING, STARTED TO DO ANYTHING, OR PREPARED TO DO ANYTHING TO END YOUR LIFE?: NO
1. IN THE PAST MONTH, HAVE YOU WISHED YOU WERE DEAD OR WISHED YOU COULD GO TO SLEEP AND NOT WAKE UP?: NO

## 2024-08-29 ASSESSMENT — PAIN SCALES - GENERAL: PAINLEVEL: 0-NO PAIN

## 2024-08-29 ASSESSMENT — ENCOUNTER SYMPTOMS
LOSS OF SENSATION IN FEET: 0
OCCASIONAL FEELINGS OF UNSTEADINESS: 0
DEPRESSION: 0

## 2024-08-29 ASSESSMENT — PATIENT HEALTH QUESTIONNAIRE - PHQ9
SUM OF ALL RESPONSES TO PHQ9 QUESTIONS 1 AND 2: 0
2. FEELING DOWN, DEPRESSED OR HOPELESS: NOT AT ALL
1. LITTLE INTEREST OR PLEASURE IN DOING THINGS: NOT AT ALL

## 2024-08-29 NOTE — PROGRESS NOTES
Pt seen in office today for a new patient  visit with Taylor Rubi  for management of her YENNY. She is without complaints today and denies pain.     Medications, pharmacy preference and allergies were reviewed with patient and updated in the medical record by MD.    Per orders, She is to receive 4 weekly iron infusions and will start vitamin B12 home injections. She will return to clinic in 3 months with labs prior to her visit.    Our contact information was given to patient and they were encouraged to contact us with any questions or concerns.     Patient verbalized understanding and agreement regarding discussed information via verbal feedback. Pt escorted to scheduling.

## 2024-09-16 ENCOUNTER — INFUSION (OUTPATIENT)
Dept: HEMATOLOGY/ONCOLOGY | Facility: CLINIC | Age: 69
End: 2024-09-16
Payer: MEDICARE

## 2024-09-16 VITALS
TEMPERATURE: 97.5 F | RESPIRATION RATE: 17 BRPM | SYSTOLIC BLOOD PRESSURE: 119 MMHG | OXYGEN SATURATION: 97 % | HEART RATE: 68 BPM | WEIGHT: 217.37 LBS | BODY MASS INDEX: 39.85 KG/M2 | DIASTOLIC BLOOD PRESSURE: 78 MMHG

## 2024-09-16 DIAGNOSIS — D50.8 IRON DEFICIENCY ANEMIA SECONDARY TO INADEQUATE DIETARY IRON INTAKE: ICD-10-CM

## 2024-09-16 PROCEDURE — 96365 THER/PROPH/DIAG IV INF INIT: CPT | Mod: INF

## 2024-09-16 PROCEDURE — 2500000004 HC RX 250 GENERAL PHARMACY W/ HCPCS (ALT 636 FOR OP/ED): Performed by: INTERNAL MEDICINE

## 2024-09-16 RX ORDER — DIPHENHYDRAMINE HYDROCHLORIDE 50 MG/ML
50 INJECTION INTRAMUSCULAR; INTRAVENOUS AS NEEDED
Status: DISCONTINUED | OUTPATIENT
Start: 2024-09-16 | End: 2024-09-16 | Stop reason: HOSPADM

## 2024-09-16 RX ORDER — EPINEPHRINE 0.3 MG/.3ML
0.3 INJECTION SUBCUTANEOUS EVERY 5 MIN PRN
OUTPATIENT
Start: 2024-09-19

## 2024-09-16 RX ORDER — FAMOTIDINE 10 MG/ML
20 INJECTION INTRAVENOUS ONCE AS NEEDED
Status: DISCONTINUED | OUTPATIENT
Start: 2024-09-16 | End: 2024-09-16 | Stop reason: HOSPADM

## 2024-09-16 RX ORDER — DIPHENHYDRAMINE HYDROCHLORIDE 50 MG/ML
50 INJECTION INTRAMUSCULAR; INTRAVENOUS AS NEEDED
OUTPATIENT
Start: 2024-09-19

## 2024-09-16 RX ORDER — FAMOTIDINE 10 MG/ML
20 INJECTION INTRAVENOUS ONCE AS NEEDED
OUTPATIENT
Start: 2024-09-19

## 2024-09-16 RX ORDER — ALBUTEROL SULFATE 0.83 MG/ML
3 SOLUTION RESPIRATORY (INHALATION) AS NEEDED
Status: DISCONTINUED | OUTPATIENT
Start: 2024-09-16 | End: 2024-09-16 | Stop reason: HOSPADM

## 2024-09-16 RX ORDER — EPINEPHRINE 0.3 MG/.3ML
0.3 INJECTION SUBCUTANEOUS EVERY 5 MIN PRN
Status: DISCONTINUED | OUTPATIENT
Start: 2024-09-16 | End: 2024-09-16 | Stop reason: HOSPADM

## 2024-09-16 RX ORDER — ALBUTEROL SULFATE 0.83 MG/ML
3 SOLUTION RESPIRATORY (INHALATION) AS NEEDED
OUTPATIENT
Start: 2024-09-19

## 2024-09-16 ASSESSMENT — PAIN SCALES - GENERAL: PAINLEVEL: 0-NO PAIN

## 2024-09-23 ENCOUNTER — INFUSION (OUTPATIENT)
Dept: HEMATOLOGY/ONCOLOGY | Facility: CLINIC | Age: 69
End: 2024-09-23
Payer: MEDICARE

## 2024-09-23 VITALS
BODY MASS INDEX: 40.5 KG/M2 | HEART RATE: 75 BPM | DIASTOLIC BLOOD PRESSURE: 87 MMHG | SYSTOLIC BLOOD PRESSURE: 131 MMHG | RESPIRATION RATE: 18 BRPM | WEIGHT: 220.9 LBS | OXYGEN SATURATION: 97 % | TEMPERATURE: 97.3 F

## 2024-09-23 DIAGNOSIS — D50.8 IRON DEFICIENCY ANEMIA SECONDARY TO INADEQUATE DIETARY IRON INTAKE: ICD-10-CM

## 2024-09-23 PROCEDURE — 96365 THER/PROPH/DIAG IV INF INIT: CPT | Mod: INF

## 2024-09-23 PROCEDURE — 2500000004 HC RX 250 GENERAL PHARMACY W/ HCPCS (ALT 636 FOR OP/ED): Performed by: INTERNAL MEDICINE

## 2024-09-23 RX ORDER — FAMOTIDINE 10 MG/ML
20 INJECTION INTRAVENOUS ONCE AS NEEDED
Status: DISCONTINUED | OUTPATIENT
Start: 2024-09-23 | End: 2024-09-23 | Stop reason: HOSPADM

## 2024-09-23 RX ORDER — DIPHENHYDRAMINE HYDROCHLORIDE 50 MG/ML
50 INJECTION INTRAMUSCULAR; INTRAVENOUS AS NEEDED
Status: DISCONTINUED | OUTPATIENT
Start: 2024-09-23 | End: 2024-09-23 | Stop reason: HOSPADM

## 2024-09-23 RX ORDER — ALBUTEROL SULFATE 0.83 MG/ML
3 SOLUTION RESPIRATORY (INHALATION) AS NEEDED
Status: DISCONTINUED | OUTPATIENT
Start: 2024-09-23 | End: 2024-09-23 | Stop reason: HOSPADM

## 2024-09-23 RX ORDER — EPINEPHRINE 0.3 MG/.3ML
0.3 INJECTION SUBCUTANEOUS EVERY 5 MIN PRN
Status: DISCONTINUED | OUTPATIENT
Start: 2024-09-23 | End: 2024-09-23 | Stop reason: HOSPADM

## 2024-09-23 RX ORDER — ALBUTEROL SULFATE 0.83 MG/ML
3 SOLUTION RESPIRATORY (INHALATION) AS NEEDED
Status: CANCELLED | OUTPATIENT
Start: 2024-09-26

## 2024-09-23 RX ORDER — EPINEPHRINE 0.3 MG/.3ML
0.3 INJECTION SUBCUTANEOUS EVERY 5 MIN PRN
OUTPATIENT
Start: 2024-09-26

## 2024-09-23 RX ORDER — EPINEPHRINE 0.3 MG/.3ML
0.3 INJECTION SUBCUTANEOUS EVERY 5 MIN PRN
Status: CANCELLED | OUTPATIENT
Start: 2024-09-26

## 2024-09-23 RX ORDER — DIPHENHYDRAMINE HYDROCHLORIDE 50 MG/ML
50 INJECTION INTRAMUSCULAR; INTRAVENOUS AS NEEDED
OUTPATIENT
Start: 2024-09-26

## 2024-09-23 RX ORDER — FAMOTIDINE 10 MG/ML
20 INJECTION INTRAVENOUS ONCE AS NEEDED
OUTPATIENT
Start: 2024-09-26

## 2024-09-23 RX ORDER — ALBUTEROL SULFATE 0.83 MG/ML
3 SOLUTION RESPIRATORY (INHALATION) AS NEEDED
OUTPATIENT
Start: 2024-09-26

## 2024-09-23 RX ORDER — FAMOTIDINE 10 MG/ML
20 INJECTION INTRAVENOUS ONCE AS NEEDED
Status: CANCELLED | OUTPATIENT
Start: 2024-09-26

## 2024-09-23 RX ORDER — DIPHENHYDRAMINE HYDROCHLORIDE 50 MG/ML
50 INJECTION INTRAMUSCULAR; INTRAVENOUS AS NEEDED
Status: CANCELLED | OUTPATIENT
Start: 2024-09-26

## 2024-09-23 ASSESSMENT — PAIN SCALES - GENERAL: PAINLEVEL: 0-NO PAIN

## 2024-09-23 NOTE — PROGRESS NOTES
Pt tolerated IV iron well. Stayed for full 30min observation afterwards. Pt states she feels well with no questions or complaints upon discharge.

## 2024-09-30 ENCOUNTER — INFUSION (OUTPATIENT)
Dept: HEMATOLOGY/ONCOLOGY | Facility: CLINIC | Age: 69
End: 2024-09-30
Payer: MEDICARE

## 2024-09-30 VITALS
DIASTOLIC BLOOD PRESSURE: 81 MMHG | OXYGEN SATURATION: 98 % | BODY MASS INDEX: 40.29 KG/M2 | SYSTOLIC BLOOD PRESSURE: 138 MMHG | HEART RATE: 77 BPM | TEMPERATURE: 98.1 F | WEIGHT: 219.8 LBS

## 2024-09-30 DIAGNOSIS — D50.8 IRON DEFICIENCY ANEMIA SECONDARY TO INADEQUATE DIETARY IRON INTAKE: ICD-10-CM

## 2024-09-30 PROCEDURE — 96365 THER/PROPH/DIAG IV INF INIT: CPT | Mod: INF

## 2024-09-30 PROCEDURE — 2500000004 HC RX 250 GENERAL PHARMACY W/ HCPCS (ALT 636 FOR OP/ED): Performed by: INTERNAL MEDICINE

## 2024-09-30 RX ORDER — FAMOTIDINE 10 MG/ML
20 INJECTION INTRAVENOUS ONCE AS NEEDED
Status: DISCONTINUED | OUTPATIENT
Start: 2024-09-30 | End: 2024-09-30 | Stop reason: HOSPADM

## 2024-09-30 RX ORDER — ALBUTEROL SULFATE 0.83 MG/ML
3 SOLUTION RESPIRATORY (INHALATION) AS NEEDED
OUTPATIENT
Start: 2024-10-03

## 2024-09-30 RX ORDER — DIPHENHYDRAMINE HYDROCHLORIDE 50 MG/ML
50 INJECTION INTRAMUSCULAR; INTRAVENOUS AS NEEDED
OUTPATIENT
Start: 2024-10-03

## 2024-09-30 RX ORDER — EPINEPHRINE 0.3 MG/.3ML
0.3 INJECTION SUBCUTANEOUS EVERY 5 MIN PRN
OUTPATIENT
Start: 2024-10-03

## 2024-09-30 RX ORDER — DIPHENHYDRAMINE HYDROCHLORIDE 50 MG/ML
50 INJECTION INTRAMUSCULAR; INTRAVENOUS AS NEEDED
Status: DISCONTINUED | OUTPATIENT
Start: 2024-09-30 | End: 2024-09-30 | Stop reason: HOSPADM

## 2024-09-30 RX ORDER — EPINEPHRINE 0.3 MG/.3ML
0.3 INJECTION SUBCUTANEOUS EVERY 5 MIN PRN
Status: DISCONTINUED | OUTPATIENT
Start: 2024-09-30 | End: 2024-09-30 | Stop reason: HOSPADM

## 2024-09-30 RX ORDER — ALBUTEROL SULFATE 0.83 MG/ML
3 SOLUTION RESPIRATORY (INHALATION) AS NEEDED
Status: DISCONTINUED | OUTPATIENT
Start: 2024-09-30 | End: 2024-09-30 | Stop reason: HOSPADM

## 2024-09-30 RX ORDER — FAMOTIDINE 10 MG/ML
20 INJECTION INTRAVENOUS ONCE AS NEEDED
OUTPATIENT
Start: 2024-10-03

## 2024-09-30 ASSESSMENT — PAIN SCALES - GENERAL: PAINLEVEL: 0-NO PAIN

## 2024-10-07 ENCOUNTER — INFUSION (OUTPATIENT)
Dept: HEMATOLOGY/ONCOLOGY | Facility: CLINIC | Age: 69
End: 2024-10-07
Payer: MEDICARE

## 2024-10-07 VITALS
SYSTOLIC BLOOD PRESSURE: 138 MMHG | WEIGHT: 221.56 LBS | RESPIRATION RATE: 18 BRPM | OXYGEN SATURATION: 97 % | BODY MASS INDEX: 40.62 KG/M2 | HEART RATE: 61 BPM | DIASTOLIC BLOOD PRESSURE: 78 MMHG | TEMPERATURE: 96.4 F

## 2024-10-07 DIAGNOSIS — D50.8 IRON DEFICIENCY ANEMIA SECONDARY TO INADEQUATE DIETARY IRON INTAKE: ICD-10-CM

## 2024-10-07 PROCEDURE — 2500000004 HC RX 250 GENERAL PHARMACY W/ HCPCS (ALT 636 FOR OP/ED): Performed by: INTERNAL MEDICINE

## 2024-10-07 PROCEDURE — 96365 THER/PROPH/DIAG IV INF INIT: CPT | Mod: INF

## 2024-10-07 RX ORDER — EPINEPHRINE 0.3 MG/.3ML
0.3 INJECTION SUBCUTANEOUS EVERY 5 MIN PRN
Status: DISCONTINUED | OUTPATIENT
Start: 2024-10-07 | End: 2024-10-07 | Stop reason: HOSPADM

## 2024-10-07 RX ORDER — ALBUTEROL SULFATE 0.83 MG/ML
3 SOLUTION RESPIRATORY (INHALATION) AS NEEDED
Status: CANCELLED | OUTPATIENT
Start: 2024-10-10

## 2024-10-07 RX ORDER — DIPHENHYDRAMINE HYDROCHLORIDE 50 MG/ML
50 INJECTION INTRAMUSCULAR; INTRAVENOUS AS NEEDED
Status: DISCONTINUED | OUTPATIENT
Start: 2024-10-07 | End: 2024-10-07 | Stop reason: HOSPADM

## 2024-10-07 RX ORDER — FAMOTIDINE 10 MG/ML
20 INJECTION INTRAVENOUS ONCE AS NEEDED
Status: CANCELLED | OUTPATIENT
Start: 2024-10-10

## 2024-10-07 RX ORDER — DIPHENHYDRAMINE HYDROCHLORIDE 50 MG/ML
50 INJECTION INTRAMUSCULAR; INTRAVENOUS AS NEEDED
Status: CANCELLED | OUTPATIENT
Start: 2024-10-10

## 2024-10-07 RX ORDER — FAMOTIDINE 10 MG/ML
20 INJECTION INTRAVENOUS ONCE AS NEEDED
Status: DISCONTINUED | OUTPATIENT
Start: 2024-10-07 | End: 2024-10-07 | Stop reason: HOSPADM

## 2024-10-07 RX ORDER — EPINEPHRINE 0.3 MG/.3ML
0.3 INJECTION SUBCUTANEOUS EVERY 5 MIN PRN
Status: CANCELLED | OUTPATIENT
Start: 2024-10-10

## 2024-10-07 RX ORDER — ALBUTEROL SULFATE 0.83 MG/ML
3 SOLUTION RESPIRATORY (INHALATION) AS NEEDED
Status: DISCONTINUED | OUTPATIENT
Start: 2024-10-07 | End: 2024-10-07 | Stop reason: HOSPADM

## 2024-10-07 ASSESSMENT — PAIN SCALES - GENERAL: PAINLEVEL: 0-NO PAIN

## 2024-10-07 NOTE — PROGRESS NOTES
Pt tolerated IV iron well. Refuses to stay for full 30min observation as she has had no issues in the past. Pt states she feels well with no questions or complaints upon discharge.

## 2024-10-22 ENCOUNTER — OFFICE VISIT (OUTPATIENT)
Dept: PRIMARY CARE | Facility: CLINIC | Age: 69
End: 2024-10-22
Payer: MEDICARE

## 2024-10-22 VITALS
HEART RATE: 73 BPM | WEIGHT: 223 LBS | TEMPERATURE: 96.1 F | DIASTOLIC BLOOD PRESSURE: 80 MMHG | SYSTOLIC BLOOD PRESSURE: 122 MMHG | HEIGHT: 61 IN | BODY MASS INDEX: 42.1 KG/M2 | OXYGEN SATURATION: 98 %

## 2024-10-22 DIAGNOSIS — K21.9 GASTROESOPHAGEAL REFLUX DISEASE, UNSPECIFIED WHETHER ESOPHAGITIS PRESENT: ICD-10-CM

## 2024-10-22 DIAGNOSIS — Z71.85 VACCINE COUNSELING: ICD-10-CM

## 2024-10-22 DIAGNOSIS — E78.2 MIXED HYPERLIPIDEMIA: Primary | ICD-10-CM

## 2024-10-22 DIAGNOSIS — Z23 ENCOUNTER FOR IMMUNIZATION: ICD-10-CM

## 2024-10-22 PROCEDURE — 3008F BODY MASS INDEX DOCD: CPT | Performed by: STUDENT IN AN ORGANIZED HEALTH CARE EDUCATION/TRAINING PROGRAM

## 2024-10-22 PROCEDURE — 3079F DIAST BP 80-89 MM HG: CPT | Performed by: STUDENT IN AN ORGANIZED HEALTH CARE EDUCATION/TRAINING PROGRAM

## 2024-10-22 PROCEDURE — 3048F LDL-C <100 MG/DL: CPT | Performed by: STUDENT IN AN ORGANIZED HEALTH CARE EDUCATION/TRAINING PROGRAM

## 2024-10-22 PROCEDURE — 99214 OFFICE O/P EST MOD 30 MIN: CPT | Performed by: STUDENT IN AN ORGANIZED HEALTH CARE EDUCATION/TRAINING PROGRAM

## 2024-10-22 PROCEDURE — 3074F SYST BP LT 130 MM HG: CPT | Performed by: STUDENT IN AN ORGANIZED HEALTH CARE EDUCATION/TRAINING PROGRAM

## 2024-10-22 PROCEDURE — 1159F MED LIST DOCD IN RCRD: CPT | Performed by: STUDENT IN AN ORGANIZED HEALTH CARE EDUCATION/TRAINING PROGRAM

## 2024-10-22 PROCEDURE — 3062F POS MACROALBUMINURIA REV: CPT | Performed by: STUDENT IN AN ORGANIZED HEALTH CARE EDUCATION/TRAINING PROGRAM

## 2024-10-22 PROCEDURE — 1125F AMNT PAIN NOTED PAIN PRSNT: CPT | Performed by: STUDENT IN AN ORGANIZED HEALTH CARE EDUCATION/TRAINING PROGRAM

## 2024-10-22 PROCEDURE — 90662 IIV NO PRSV INCREASED AG IM: CPT | Performed by: STUDENT IN AN ORGANIZED HEALTH CARE EDUCATION/TRAINING PROGRAM

## 2024-10-22 RX ORDER — PANTOPRAZOLE SODIUM 20 MG/1
20 TABLET, DELAYED RELEASE ORAL
Qty: 30 TABLET | Refills: 2 | Status: SHIPPED | OUTPATIENT
Start: 2024-10-22 | End: 2025-01-20

## 2024-10-22 ASSESSMENT — PAIN SCALES - GENERAL: PAINLEVEL_OUTOF10: 3

## 2024-10-22 NOTE — PATIENT INSTRUCTIONS
Thank you for coming to see me today.    Flu vaccine in clinic today.    Keep upcoming specialist appointments as scheduled.    New heartburn medication, pantoprazole, sent to pharmacy.  Take daily for at least 2 weeks as prescribed, can consider decreasing frequency or stopping if symptoms have improved and focusing on lifestyle measures as we discussed.    Follow up in April for Medicare Wellness Visit/Preventative Care Exam, as needed for acute concerns.

## 2024-10-22 NOTE — PROGRESS NOTES
"Subjective   Jil Harper is a 69 y.o. female who presents for folllow up on specialist.    HPI:        Hyperlipidemia:  Managed with pravastatin 80 mg daily along with lifestyle measures.    Lab Results   Component Value Date    CHOL 171 04/16/2024    CHOL 179 05/31/2023    CHOL 164 03/17/2022     Lab Results   Component Value Date    HDL 65.0 04/16/2024    HDL 68 05/31/2023    HDL 76 03/17/2022     Lab Results   Component Value Date    LDLCALC 83 04/16/2024    LDLCALC 96 05/31/2023    LDLCALC 72 03/17/2022     Lab Results   Component Value Date    TRIG 115 04/16/2024    TRIG 76 05/31/2023    TRIG 80 03/17/2022     No components found for: \"CHOLHDL\"    Cardiology Eval 6/21/2024  Jil Harper 68 y.o. female who presents to establish care:     1.  Mild aortic stenosis  2.  Hypertension  3.  Peripheral arterial disease  4.  Type 2 diabetes mellitus     Reviewed the findings of her echocardiogram-mild aortic stenosis.     Follow-up in clinic in 1 year with echocardiograms every 3 to 5 years.     Heme Onc 8/29/2024    Has had 4 iron infusion, weekly B12 injections from [facility]  Assessment/Plan:  ? Anemia:     Iron def and Vit b12 def related. Could not tolerate oral iron. Venofer 300 mg x 4 ordered.     Vit b12 def: IM inj weekly for 12 ordered     RTC in 3 months with labs    Low Back Pain    Frequent Heart Burn:  Takes Omeprazole, does not feel like it is working well.    Vaccine Counseling:    Immunization History   Administered Date(s) Administered    Flu vaccine, trivalent, preservative free, HIGH-DOSE, age 65y+ (Fluzone) 10/22/2024    Moderna SARS-CoV-2 Vaccination 03/22/2021, 04/19/2021, 01/03/2022    Pneumococcal conjugate vaccine, 20-valent (PREVNAR 20) 04/15/2024    Pneumococcal polysaccharide vaccine, 23-valent, age 2 years and older (PNEUMOVAX 23) 07/16/2019    Tdap vaccine, age 7 year and older (BOOSTRIX, ADACEL) 04/15/2024         ROS:   Review of systems is essentially negative for all systems except for " "any identified issues in HPI above.    Objective     /80   Pulse 73   Temp 35.6 °C (96.1 °F)   Ht 1.549 m (5' 1\")   Wt 101 kg (223 lb)   SpO2 98%   BMI 42.14 kg/m²      PHYSICAL EXAM    GENERAL  Well-appearing, pleasant and cooperative.  No acute distress.    HEENT  HEAD:   Normocephalic.  Atraumatic.  EYES:  PERRLA.  No scleral icterus or conjunctival injection.  EARS:  Tympanic membranes visualized bilaterally without erythema, fluid, or bulging.  NECK:  No adenopathy.  No palpable thyroid enlargement or nodules.    THROAT:  Moist oropharynx without tonsillar enlargement or exudates.    LUNGS:    Clear to auscultation bilaterally.  No wheezes, rales, rhonchi.    CARDIAC:  Regular rate and rhythm.  Normal S1S2.  No murmurs/rubs/gallops.    ABDOMEN:  Soft, non-tender, non-distended.  No hepatosplenomegaly.  Normoactive bowel sounds.    MUSCULOSKELETAL:  No gross abnormalities.   No joint swelling or erythema,.  No spinal or paraspinal tenderness to palpation.    EXTREMITIES:  No LE edema or cyanosis.      NEURO           Alert and oriented x3. No focal deficits.    PSYCH:          Affect appropriate.           Assessment/Plan   Problem List Items Addressed This Visit       Mixed hyperlipidemia - Primary     Continue pravastatin at current dose.          Other Visit Diagnoses       Vaccine counseling        Relevant Orders    Flu vaccine, trivalent, preservative free, HIGH-DOSE, age 65y+ (Fluzone) (Completed)    Encounter for immunization        Relevant Orders    Flu vaccine, trivalent, preservative free, HIGH-DOSE, age 65y+ (Fluzone) (Completed)    Gastroesophageal reflux disease, unspecified whether esophagitis present        Relevant Medications    pantoprazole (Protonix) 20 mg EC tablet                 Nette Duckworth MD    "

## 2024-11-19 NOTE — PROGRESS NOTES
HPI   69 yo with Diabetes 2 (dx 2019, + retinopathy) b12 def, Dyslipidemia, osteoporosis presents for followup. Last A1c 5.9%, today 5.8%.      Pt is testing sugars 1-2 times per day. Pt is having low sugars 0 times/week. Pt's typical blood sugars 100-110's on waking, after dinner 140-200's . Pt is following a carb controlled diet and knows reasonable carb allowances. Pt is able to afford their medications. Pt is not all that active.           Taking metfomin 500mg er (4), came off trulicity due to cost in july 2020, still expensive at [facility] in 2021/doesn't qualify for pt assistance. Was on trulicity 0.75mg may 2023 visit, pt currently off it due to cost.           Pt back taking pravastatin 80 mg (increased last visit) for some time.           Pt was taking b12 1mg day, now only taking a B complex supplement.     Bones:  Dexa 5-1-24: t scores: L spine -1.4, L femur -2.1, L femoral neck -2.4   Reclast #1 (7/8/24), likely plan serious of 3    -pt with iron def anemia, neg cologuard, on iron and b12 infusions with hematology          Current Outpatient Medications:     ALPHA LIPOIC ACID ORAL, Alpha Lipoic Acid, Disp: , Rfl:     Autolet (OneTouch Delica Plus Lanc Dev) lancing device, As directed tid, Disp: , Rfl:     berberine/herbal complex no.18 (BERBERINE-HERBAL COMB NO.18 ORAL), Take by mouth., Disp: , Rfl:     blood sugar diagnostic (OneTouch Ultra Test) strip, As directed test once a day, Disp: 100 strip, Rfl: 3    cyanocobalamin (Vitamin B-12) 1,000 mcg/mL injection, Inject 1 mL (1,000 mcg) into the muscle 1 (one) time per week in the early morning.., Disp: 12 mL, Rfl: 0    ferrous sulfate 325 (65 Fe) MG EC tablet, Take 65 mg by mouth once daily with breakfast. Do not crush, chew, or split., Disp: , Rfl:     metFORMIN  mg 24 hr tablet, Take 2 tablets (1,000 mg) by mouth 2 times a day., Disp: 360 tablet, Rfl: 2    pantoprazole (Protonix) 20 mg EC tablet, Take 1 tablet (20 mg) by mouth once daily in  "the morning. Take before meals. Do not crush, chew, or split., Disp: 30 tablet, Rfl: 2    pravastatin (Pravachol) 80 mg tablet, Take 1 tablet (80 mg) by mouth once daily., Disp: 90 tablet, Rfl: 3    syringe with needle (Syringe 3cc/25Gx1\") 3 mL 25 gauge x 1\" syringe, 12 Act 1 (one) time per week in the early morning.., Disp: 12 each, Rfl: 0    vit B complex 100 combo no.2 (B-100 COMPLEX ORAL), B-100 Complex, Disp: , Rfl:       Allergies as of 11/20/2024    (No Known Allergies)         Review of Systems   Cardiology: Lightheadedness-denies.  Chest pain-denies.  Leg edema-denies.  Palpitations-denies.  Respiratory: Cough-denies. Shortness of breath-denies.  Wheezing-denies.  Gastroenterology: Constipation-denies.  Diarrhea-denies.  Heartburn-at times.  Endocrinology: Cold intolerance-denies.  Heat intolerance-denies.  Sweats-denies.  Neurology: Headache-denies.  Tremor-denies.  Neuropathy in extremities-denies.  Psychology: Low energy-at times.  Irritability-denies.  Sleep disturbances +      /72 (BP Location: Left arm, Patient Position: Sitting)   Pulse 68   Ht 1.549 m (5' 1\")   Wt 101 kg (221 lb 9.6 oz)   BMI 41.87 kg/m²       Labs:  Lab Results   Component Value Date    WBC 4.6 06/07/2024    NRBC 0.0 06/07/2024    RBC 4.37 06/07/2024    HGB 10.4 (L) 06/07/2024    HCT 33.8 (L) 06/07/2024     06/07/2024     Lab Results   Component Value Date    CALCIUM 9.0 06/07/2024    AST 12 06/07/2024    ALKPHOS 66 06/07/2024    BILITOT 0.7 06/07/2024    PROT 6.2 06/07/2024    ALBUMIN 4.0 06/07/2024    GLOB 2.5 05/31/2023    AGR 1.6 05/31/2023     06/07/2024    K 4.4 06/07/2024     (H) 06/07/2024    CO2 23 (L) 06/07/2024    ANIONGAP 13 06/07/2024    BUN 18 06/07/2024    CREATININE 0.80 06/07/2024    UREACREAUR 13.8 05/31/2023    GLUCOSE 89 06/07/2024    ALT 8 06/07/2024    EGFR 80 06/07/2024     Lab Results   Component Value Date    CHOL 171 04/16/2024    TRIG 115 04/16/2024    HDL 65.0 04/16/2024    " LDLCALC 83 04/16/2024     Lab Results   Component Value Date    MICROALBCREA  06/07/2024      Comment:      One or more analytes used in this calculation is outside of the analytical measurement range. Calculation cannot be performed.     Lab Results   Component Value Date    TSH 2.02 04/16/2024     Lab Results   Component Value Date    [id number] 179 (L) 06/07/2024     Lab Results   Component Value Date    HGBA1C 5.8 11/20/2024         Physical Exam   General Appearance: pleasant, cooperative, no acute distress  HEENT: no chemosis, no proptosis, no lid lag, no lid retraction  Neck: no lymphadenopathy, no thyromegaly, no dominant thyroid nodules  Heart: no murmurs, regular rate and rhythm, S1 and S2  Lungs: no wheezes, no rhonci, no rales  Extremities: no lower extremity swelling      Assessment/Plan   1. Uncontrolled type 2 diabetes mellitus with hyperglycemia (Primary)  -A1c ordered and reviewed  -glycemic values reviewed  -labs reviewed and ordered    -overall doing well no change    2. Mixed hyperlipidemia  -on statin, repeat labs, target ldl<70    3. Essential hypertension  -at target after resting, will follow    4. Osteoporosis, unspecified osteoporosis type, unspecified pathological fracture presence  -s/p reclast #1, likely have serious of 3  -due for repeat dexa in 2026         Follow Up:  Maulik 6 months    -labs/tests/notes reviewed  -reviewed and counseled patient on medication monitoring and side effects

## 2024-11-20 ENCOUNTER — TELEPHONE (OUTPATIENT)
Dept: HEMATOLOGY/ONCOLOGY | Facility: CLINIC | Age: 69
End: 2024-11-20

## 2024-11-20 ENCOUNTER — APPOINTMENT (OUTPATIENT)
Dept: ENDOCRINOLOGY | Facility: CLINIC | Age: 69
End: 2024-11-20
Payer: MEDICARE

## 2024-11-20 VITALS
HEART RATE: 68 BPM | WEIGHT: 221.6 LBS | DIASTOLIC BLOOD PRESSURE: 72 MMHG | SYSTOLIC BLOOD PRESSURE: 122 MMHG | BODY MASS INDEX: 41.84 KG/M2 | HEIGHT: 61 IN

## 2024-11-20 DIAGNOSIS — E78.2 MIXED HYPERLIPIDEMIA: ICD-10-CM

## 2024-11-20 DIAGNOSIS — M81.0 OSTEOPOROSIS, UNSPECIFIED OSTEOPOROSIS TYPE, UNSPECIFIED PATHOLOGICAL FRACTURE PRESENCE: ICD-10-CM

## 2024-11-20 DIAGNOSIS — I10 ESSENTIAL HYPERTENSION: ICD-10-CM

## 2024-11-20 DIAGNOSIS — E11.65 UNCONTROLLED TYPE 2 DIABETES MELLITUS WITH HYPERGLYCEMIA: Primary | ICD-10-CM

## 2024-11-20 LAB — POC HEMOGLOBIN A1C: 5.8 % (ref 4.2–6.5)

## 2024-11-20 PROCEDURE — 1126F AMNT PAIN NOTED NONE PRSNT: CPT | Performed by: INTERNAL MEDICINE

## 2024-11-20 PROCEDURE — 1159F MED LIST DOCD IN RCRD: CPT | Performed by: INTERNAL MEDICINE

## 2024-11-20 PROCEDURE — 3074F SYST BP LT 130 MM HG: CPT | Performed by: INTERNAL MEDICINE

## 2024-11-20 PROCEDURE — 83036 HEMOGLOBIN GLYCOSYLATED A1C: CPT | Performed by: INTERNAL MEDICINE

## 2024-11-20 PROCEDURE — 3078F DIAST BP <80 MM HG: CPT | Performed by: INTERNAL MEDICINE

## 2024-11-20 PROCEDURE — 99214 OFFICE O/P EST MOD 30 MIN: CPT | Performed by: INTERNAL MEDICINE

## 2024-11-20 PROCEDURE — 3008F BODY MASS INDEX DOCD: CPT | Performed by: INTERNAL MEDICINE

## 2024-11-20 PROCEDURE — 3048F LDL-C <100 MG/DL: CPT | Performed by: INTERNAL MEDICINE

## 2024-11-20 PROCEDURE — 3062F POS MACROALBUMINURIA REV: CPT | Performed by: INTERNAL MEDICINE

## 2024-11-20 PROCEDURE — 1036F TOBACCO NON-USER: CPT | Performed by: INTERNAL MEDICINE

## 2024-11-20 ASSESSMENT — PAIN SCALES - GENERAL: PAINLEVEL_OUTOF10: 0-NO PAIN

## 2024-11-20 ASSESSMENT — ENCOUNTER SYMPTOMS
OCCASIONAL FEELINGS OF UNSTEADINESS: 0
DEPRESSION: 1
LOSS OF SENSATION IN FEET: 0

## 2024-11-25 ENCOUNTER — LAB (OUTPATIENT)
Dept: LAB | Facility: LAB | Age: 69
End: 2024-11-25
Payer: MEDICARE

## 2024-11-25 DIAGNOSIS — E78.2 MIXED HYPERLIPIDEMIA: ICD-10-CM

## 2024-11-25 DIAGNOSIS — E11.65 UNCONTROLLED TYPE 2 DIABETES MELLITUS WITH HYPERGLYCEMIA: ICD-10-CM

## 2024-11-25 DIAGNOSIS — D50.9 IRON DEFICIENCY ANEMIA, UNSPECIFIED IRON DEFICIENCY ANEMIA TYPE: ICD-10-CM

## 2024-11-25 LAB
ALBUMIN SERPL BCP-MCNC: 4.2 G/DL (ref 3.4–5)
ALP SERPL-CCNC: 48 U/L (ref 33–136)
ALT SERPL W P-5'-P-CCNC: 10 U/L (ref 7–45)
ANION GAP SERPL CALCULATED.3IONS-SCNC: 11 MMOL/L (ref 10–20)
AST SERPL W P-5'-P-CCNC: 13 U/L (ref 9–39)
BASOPHILS # BLD AUTO: 0.04 X10*3/UL (ref 0–0.1)
BASOPHILS NFR BLD AUTO: 0.9 %
BILIRUB SERPL-MCNC: 1 MG/DL (ref 0–1.2)
BUN SERPL-MCNC: 16 MG/DL (ref 6–23)
CALCIUM SERPL-MCNC: 9.1 MG/DL (ref 8.6–10.3)
CHLORIDE SERPL-SCNC: 107 MMOL/L (ref 98–107)
CHOLEST SERPL-MCNC: 142 MG/DL (ref 0–199)
CHOLEST/HDLC SERPL: 2.2 {RATIO}
CO2 SERPL-SCNC: 27 MMOL/L (ref 21–32)
CREAT SERPL-MCNC: 0.7 MG/DL (ref 0.5–1.05)
CREAT UR-MCNC: 111 MG/DL (ref 20–320)
EGFRCR SERPLBLD CKD-EPI 2021: >90 ML/MIN/1.73M*2
EOSINOPHIL # BLD AUTO: 0.12 X10*3/UL (ref 0–0.7)
EOSINOPHIL NFR BLD AUTO: 2.6 %
ERYTHROCYTE [DISTWIDTH] IN BLOOD BY AUTOMATED COUNT: 15.6 % (ref 11.5–14.5)
FERRITIN SERPL-MCNC: 135 NG/ML (ref 8–150)
GLUCOSE SERPL-MCNC: 87 MG/DL (ref 74–99)
HCT VFR BLD AUTO: 43.8 % (ref 36–46)
HDLC SERPL-MCNC: 65.8 MG/DL
HGB BLD-MCNC: 14.4 G/DL (ref 12–16)
IMM GRANULOCYTES # BLD AUTO: 0.01 X10*3/UL (ref 0–0.7)
IMM GRANULOCYTES NFR BLD AUTO: 0.2 % (ref 0–0.9)
IRON SATN MFR SERPL: 30 % (ref 25–45)
IRON SERPL-MCNC: 98 UG/DL (ref 35–150)
LDLC SERPL CALC-MCNC: 62 MG/DL
LYMPHOCYTES # BLD AUTO: 1.23 X10*3/UL (ref 1.2–4.8)
LYMPHOCYTES NFR BLD AUTO: 26.5 %
MCH RBC QN AUTO: 27.8 PG (ref 26–34)
MCHC RBC AUTO-ENTMCNC: 32.9 G/DL (ref 32–36)
MCV RBC AUTO: 85 FL (ref 80–100)
MICROALBUMIN UR-MCNC: 10.7 MG/L
MICROALBUMIN/CREAT UR: 9.6 UG/MG CREAT
MONOCYTES # BLD AUTO: 0.39 X10*3/UL (ref 0.1–1)
MONOCYTES NFR BLD AUTO: 8.4 %
NEUTROPHILS # BLD AUTO: 2.85 X10*3/UL (ref 1.2–7.7)
NEUTROPHILS NFR BLD AUTO: 61.4 %
NON HDL CHOLESTEROL: 76 MG/DL (ref 0–149)
NRBC BLD-RTO: 0 /100 WBCS (ref 0–0)
PLATELET # BLD AUTO: 210 X10*3/UL (ref 150–450)
POTASSIUM SERPL-SCNC: 4.1 MMOL/L (ref 3.5–5.3)
PROT SERPL-MCNC: 6.5 G/DL (ref 6.4–8.2)
RBC # BLD AUTO: 5.18 X10*6/UL (ref 4–5.2)
SODIUM SERPL-SCNC: 141 MMOL/L (ref 136–145)
TIBC SERPL-MCNC: 325 UG/DL (ref 240–445)
TRIGL SERPL-MCNC: 73 MG/DL (ref 0–149)
UIBC SERPL-MCNC: 227 UG/DL (ref 110–370)
VLDL: 15 MG/DL (ref 0–40)
WBC # BLD AUTO: 4.6 X10*3/UL (ref 4.4–11.3)

## 2024-11-25 PROCEDURE — 85025 COMPLETE CBC W/AUTO DIFF WBC: CPT

## 2024-11-25 PROCEDURE — 80061 LIPID PANEL: CPT

## 2024-11-25 PROCEDURE — 80053 COMPREHEN METABOLIC PANEL: CPT

## 2024-11-25 PROCEDURE — 36415 COLL VENOUS BLD VENIPUNCTURE: CPT

## 2024-11-25 PROCEDURE — 83550 IRON BINDING TEST: CPT

## 2024-11-25 PROCEDURE — 83540 ASSAY OF IRON: CPT

## 2024-11-25 PROCEDURE — 82728 ASSAY OF FERRITIN: CPT

## 2024-11-25 PROCEDURE — 82570 ASSAY OF URINE CREATININE: CPT

## 2024-11-25 PROCEDURE — 82043 UR ALBUMIN QUANTITATIVE: CPT

## 2024-11-25 PROCEDURE — 82306 VITAMIN D 25 HYDROXY: CPT

## 2024-11-25 PROCEDURE — 82607 VITAMIN B-12: CPT

## 2024-11-26 LAB
25(OH)D3 SERPL-MCNC: 31 NG/ML (ref 30–100)
VIT B12 SERPL-MCNC: 1416 PG/ML (ref 211–911)

## 2024-12-04 ENCOUNTER — OFFICE VISIT (OUTPATIENT)
Dept: HEMATOLOGY/ONCOLOGY | Facility: CLINIC | Age: 69
End: 2024-12-04
Payer: MEDICARE

## 2024-12-04 VITALS
WEIGHT: 221.56 LBS | TEMPERATURE: 97.2 F | OXYGEN SATURATION: 96 % | BODY MASS INDEX: 41.86 KG/M2 | HEART RATE: 77 BPM | SYSTOLIC BLOOD PRESSURE: 152 MMHG | RESPIRATION RATE: 18 BRPM | DIASTOLIC BLOOD PRESSURE: 86 MMHG

## 2024-12-04 DIAGNOSIS — D50.9 IRON DEFICIENCY ANEMIA, UNSPECIFIED IRON DEFICIENCY ANEMIA TYPE: ICD-10-CM

## 2024-12-04 PROCEDURE — 1126F AMNT PAIN NOTED NONE PRSNT: CPT | Performed by: INTERNAL MEDICINE

## 2024-12-04 PROCEDURE — 1160F RVW MEDS BY RX/DR IN RCRD: CPT | Performed by: INTERNAL MEDICINE

## 2024-12-04 PROCEDURE — 1159F MED LIST DOCD IN RCRD: CPT | Performed by: INTERNAL MEDICINE

## 2024-12-04 PROCEDURE — 99214 OFFICE O/P EST MOD 30 MIN: CPT | Performed by: INTERNAL MEDICINE

## 2024-12-04 PROCEDURE — 3048F LDL-C <100 MG/DL: CPT | Performed by: INTERNAL MEDICINE

## 2024-12-04 PROCEDURE — 3079F DIAST BP 80-89 MM HG: CPT | Performed by: INTERNAL MEDICINE

## 2024-12-04 PROCEDURE — 3077F SYST BP >= 140 MM HG: CPT | Performed by: INTERNAL MEDICINE

## 2024-12-04 PROCEDURE — 3061F NEG MICROALBUMINURIA REV: CPT | Performed by: INTERNAL MEDICINE

## 2024-12-04 ASSESSMENT — PAIN SCALES - GENERAL: PAINLEVEL_OUTOF10: 0-NO PAIN

## 2024-12-04 NOTE — PROGRESS NOTES
Patient here for follow up visit with DR Vazquez for Dx of  anemia.  Patient here with alone     Medications and Allergies reviewed and reconciled this visit.    No concerns or complaints noted at this time.     Pt states she only took 11 out of her 12 injections as she was only given 11 needles.     Pt reports appetite is  good   Pt reports N/V/D : denies    Pt reports pain : denies      Pt discharged from Dr Vazquez's services. She was given the number for Weight loss clinic at [facility] at [phone]. Pt stated she would call.     Pt reports availability and use of mychart, Reviewed this is a good place to communicate with the team as well as review labs and upcoming orders.     No barriers to education noted, patient agrees to current plan and verbalized understanding using teach back method.

## 2024-12-04 NOTE — PROGRESS NOTES
Patient ID: Jil Harper is a 69 y.o. female.  Referring Physician: No referring provider defined for this encounter.  Primary Care Provider: Nette Duckworth MD  Referral Reason:     Subjective: anemia  No complaints. Took 11/12 vit b12 inj.    Heme/Onc History:  08/2024: Referred here for anemia. Denies any bleeding. No GI problems other than GERD. Takes Prilosec occasionally. She has very poor diet. Does not eat red meat or vegetables. Never had bariatric surgery.        Past Medical History:   Past Medical History:   Diagnosis Date    Abnormal ECG     B12 deficiency     Heart murmur 2021    Hypertension     Mixed hyperlipidemia     Uncontrolled type 2 diabetes mellitus with hyperglycemia      Social History:   Social History     Socioeconomic History    Marital status:      Spouse name: Not on file    Number of children: Not on file    Years of education: Not on file    Highest education level: Not on file   Occupational History    Not on file   Tobacco Use    Smoking status: Never    Smokeless tobacco: Never   Vaping Use    Vaping status: Never Used   Substance and Sexual Activity    Alcohol use: Never    Drug use: Never    Sexual activity: Defer   Other Topics Concern    Not on file   Social History Narrative    Not on file     Social Drivers of Health     Financial Resource Strain: Not on file   Food Insecurity: Not on file   Transportation Needs: Not on file   Physical Activity: Not on file   Stress: Not on file   Social Connections: Not on file   Intimate Partner Violence: Not on file   Housing Stability: Not on file     Surgical History:   Past Surgical History:   Procedure Laterality Date    CATARACT EXTRACTION  2018    right and left    LEG SURGERY      bilateral legs straightened as child    TONSILLECTOMY Bilateral      Family History:   Family History   Problem Relation Name Age of Onset    Diabetes Mother Dior Aguileraamina     Diabetes type II Mother Dior Daniel     Other (asbestos of the lung)  Father Salvatore Daniel Sr     Cancer Father Salvatore Daniel Sr     Diabetes Paternal Grandmother        reports that she has never smoked. She has never used smokeless tobacco.  Oncology Family history: Cancer-related family history includes Cancer in her father.    Review Of Systems:  As stated per in HPI; otherwise all other 12 point ROS are negative    Physical Exam:  /86 (BP Location: Right arm, Patient Position: Sitting, BP Cuff Size: Adult long)   Pulse 77   Temp 36.2 °C (97.2 °F) (Temporal)   Resp 18   Wt 101 kg (221 lb 9 oz)   SpO2 96%   BMI 41.86 kg/m²   BSA: 2.08 meters squared  General: awake/alert/oriented x3, no distress, alert and cooperative  Head: Short hair fully covering scalp. Symmetric facial expressions  Eyes: PERRL, EOMI, clear sclera, eyebrows present.  Ears/Nose/Mouth/Throat:  Oral mucous membranes moist. No oral ulcers. No palpable pre/post-auricular lymph nodes  Neck: No palpable cervical chain lymph nodes  Respiratory: unlabored breathing on room air, good chest expansion, thorax symmetric  Cardio: Regular rate and rhythm, normal S1 and S2, radial pulses symmetric  GI: Nondistended, soft, non-tender abdomen  Musculoskeletal: Normal muscle bulk and tone, ROM intact, no joint swelling.  Rises from chair and walks unassisted.  Extremities: No ankle swelling, no arm or leg wounds  Neuro: Alert, cognition intact, speech normal. Facial expressions symmetric.  No motor deficits noted. Sensation intact to touch and hot/cold.   Able to stand from seated position unassisted and walks around the room unassisted.  Psychological: Appropriate mood and behavior.  Skin: Warm and dry, no lesions, no rashes    Results:  Diagnostic Results   Lab Results   Component Value Date    WBC 4.6 11/25/2024    HGB 14.4 11/25/2024    HCT 43.8 11/25/2024    MCV 85 11/25/2024     11/25/2024     Lab Results   Component Value Date    CALCIUM 9.1 11/25/2024     11/25/2024    K 4.1 11/25/2024    CO2  "27 11/25/2024     11/25/2024    BUN 16 11/25/2024    CREATININE 0.70 11/25/2024    ALT 10 11/25/2024    AST 13 11/25/2024       Current Outpatient Medications:     ALPHA LIPOIC ACID ORAL, Alpha Lipoic Acid, Disp: , Rfl:     Autolet (OneTouch Delica Plus Lanc Dev) lancing device, As directed tid, Disp: , Rfl:     berberine/herbal complex no.18 (BERBERINE-HERBAL COMB NO.18 ORAL), Take by mouth., Disp: , Rfl:     blood sugar diagnostic (OneTouch Ultra Test) strip, As directed test once a day, Disp: 100 strip, Rfl: 3    cyanocobalamin (Vitamin B-12) 1,000 mcg/mL injection, Inject 1 mL (1,000 mcg) into the muscle 1 (one) time per week in the early morning.., Disp: 12 mL, Rfl: 0    ferrous sulfate 325 (65 Fe) MG EC tablet, Take 65 mg by mouth once daily with breakfast. Do not crush, chew, or split., Disp: , Rfl:     metFORMIN  mg 24 hr tablet, Take 2 tablets (1,000 mg) by mouth 2 times a day., Disp: 360 tablet, Rfl: 2    pantoprazole (Protonix) 20 mg EC tablet, Take 1 tablet (20 mg) by mouth once daily in the morning. Take before meals. Do not crush, chew, or split., Disp: 30 tablet, Rfl: 2    pravastatin (Pravachol) 80 mg tablet, Take 1 tablet (80 mg) by mouth once daily., Disp: 90 tablet, Rfl: 3    syringe with needle (Syringe 3cc/25Gx1\") 3 mL 25 gauge x 1\" syringe, 12 Act 1 (one) time per week in the early morning.., Disp: 12 each, Rfl: 0    vit B complex 100 combo no.2 (B-100 COMPLEX ORAL), B-100 Complex, Disp: , Rfl:      Radiology:    Pathology:    Assessment/Plan:  ? Anemia:    Iron def and Vit b12 def related. Could not tolerate oral iron. Venofer 300 mg x 4 ordered and completed on 10/7/24.    Vit b12 def: IM inj weekly x 11 completed. Will skip inj 12.    Labs are WNL. No need for further hematology follow up. Vit b12 and iron panel may be checked by PCP.    Will refer her to weight loss clinic    Diagnoses and all orders for this visit:  Iron deficiency anemia, unspecified iron deficiency anemia " type  -     Clinic Appointment Request       Performance Status: Symptomatic; fully ambulatory    I spent more than 30 minutes for the patient today, including face-to-face conversation, pre-visit preparation, post-visit orders, and others.   Taylor Rubi MD

## 2025-05-16 NOTE — PROGRESS NOTES
HPI   70 yo with Diabetes 2 (dx 2019, + retinopathy) b12 def, Dyslipidemia, osteoporosis presents for followup. Last A1c 5.8%, today 5.4%.      Pt is testing sugars 2 times per day. Pt is having low sugars 0 times/week. Pt's typical blood sugars 's on waking, after dinner 120-150's . Pt is following a carb controlled diet and knows reasonable carb allowances. Pt is able to afford their medications. Pt is not all that active.           Taking metfomin 500mg er (4), came off trulicity due to cost in july 2020, still expensive at [facility] in 2021/doesn't qualify for pt assistance. Was on trulicity 0.75mg may 2023 visit, pt currently off it due to cost.           Pt taking pravastatin 80 mg for lipids and tolerating.           Pt taking b12 1mg daily.     Bones:  Dexa 5-1-24: t scores: L spine -1.4, L femur -2.1, L femoral neck -2.4   Reclast #1 (7/8/24), likely plan serious of 3 (#2 shot July 2 2025)     -pt with iron def anemia, neg cologuard, on iron and b12 infusions, was seeing hematology, now following prn        Current Outpatient Medications:     ALPHA LIPOIC ACID ORAL, Alpha Lipoic Acid, Disp: , Rfl:     Autolet (OneTouch Delica Plus Lanc Dev) lancing device, As directed tid, Disp: , Rfl:     berberine/herbal complex no.18 (BERBERINE-HERBAL COMB NO.18 ORAL), Take by mouth., Disp: , Rfl:     blood sugar diagnostic (OneTouch Ultra Test) strip, As directed test once a day, Disp: 100 strip, Rfl: 3    cyanocobalamin (Vitamin B-12) 1,000 mcg/mL injection, Inject 1 mL (1,000 mcg) into the muscle 1 (one) time per week in the early morning.., Disp: 12 mL, Rfl: 0    ferrous sulfate 325 (65 Fe) MG EC tablet, Take 65 mg by mouth once daily with breakfast. Do not crush, chew, or split., Disp: , Rfl:     metFORMIN  mg 24 hr tablet, Take 2 tablets (1,000 mg) by mouth 2 times a day., Disp: 360 tablet, Rfl: 2    pantoprazole (Protonix) 20 mg EC tablet, Take 1 tablet (20 mg) by mouth once daily in the morning.  "Take before meals. Do not crush, chew, or split., Disp: 30 tablet, Rfl: 2    pravastatin (Pravachol) 80 mg tablet, Take 1 tablet (80 mg) by mouth once daily., Disp: 90 tablet, Rfl: 3    syringe with needle (Syringe 3cc/25Gx1\") 3 mL 25 gauge x 1\" syringe, 12 Act 1 (one) time per week in the early morning.., Disp: 12 each, Rfl: 0    vit B complex 100 combo no.2 (B-100 COMPLEX ORAL), B-100 Complex, Disp: , Rfl:       Allergies as of 05/20/2025    (No Known Allergies)         Review of Systems   Cardiology: Lightheadedness-denies.  Chest pain-denies.  Leg edema-denies.  Palpitations-denies.  Respiratory: Cough-denies. Shortness of breath-denies.  Wheezing-denies.  Gastroenterology: Constipation-denies.  Diarrhea-denies.  Heartburn-denies.  Endocrinology: Cold intolerance-denies.  Heat intolerance-denies.  Sweats-denies.  Neurology: Headache-denies.  Tremor-denies.  Neuropathy in extremities-denies.  Psychology: Low energy-denies.  Irritability-denies.  Sleep disturbances-denies.      BP (!) 153/92 (BP Location: Right arm, Patient Position: Sitting, BP Cuff Size: Adult)   Pulse 64   Wt 95 kg (209 lb 6.4 oz)   BMI 39.57 kg/m²       Labs:  Lab Results   Component Value Date    WBC 4.6 11/25/2024    NRBC 0.0 11/25/2024    RBC 5.18 11/25/2024    HGB 14.4 11/25/2024    HCT 43.8 11/25/2024     11/25/2024     Lab Results   Component Value Date    CALCIUM 9.1 11/25/2024    AST 13 11/25/2024    ALKPHOS 48 11/25/2024    BILITOT 1.0 11/25/2024    PROT 6.5 11/25/2024    ALBUMIN 4.2 11/25/2024    GLOB 2.5 05/31/2023    AGR 1.6 05/31/2023     11/25/2024    K 4.1 11/25/2024     11/25/2024    CO2 27 11/25/2024    ANIONGAP 11 11/25/2024    BUN 16 11/25/2024    CREATININE 0.70 11/25/2024    UREACREAUR 13.8 05/31/2023    GLUCOSE 87 11/25/2024    ALT 10 11/25/2024    EGFR >90 11/25/2024     Lab Results   Component Value Date    CHOL 142 11/25/2024    TRIG 73 11/25/2024    HDL 65.8 11/25/2024    LDLCALC 62 11/25/2024 "     Lab Results   Component Value Date    MICROALBCREA 9.6 11/25/2024     Lab Results   Component Value Date    TSH 2.02 04/16/2024     Lab Results   Component Value Date    [id number] 1,416 (H) 11/25/2024     Lab Results   Component Value Date    HGBA1C 5.4 05/20/2025         Physical Exam   General Appearance: pleasant, cooperative, no acute distress  HEENT: no chemosis, no proptosis, no lid lag, no lid retraction  Neck: no lymphadenopathy, no thyromegaly, no dominant thyroid nodules  Heart: + murmur(has cardiology follow up), regular rate and rhythm, S1 and S2  Lungs: no wheezes, no rhonci, no rales  Extremities: no lower extremity swelling      Assessment/Plan   1. Uncontrolled type 2 diabetes mellitus with hyperglycemia (Primary)  -A1c ordered and reviewed  -glycemic values reviewed  -labs reviewed  -refills up to date    -can test 3X/week at this point, losing wt, doing well    2. Mixed hyperlipidemia  -on statin and tolerating, labs reviewed  -repeat labs ordered    3. Essential hypertension  -at target after resting, will follow    4. Osteoporosis, unspecified osteoporosis type, unspecified pathological fracture presence  -repeat reclast for shot #2 July 9, 2025 or later (therapy plan entered)  -dexa scan in 2026           Follow Up:  pharmD 6 months    -labs/tests/notes reviewed  -reviewed and counseled patient on medication monitoring and side effects

## 2025-05-20 ENCOUNTER — APPOINTMENT (OUTPATIENT)
Dept: ENDOCRINOLOGY | Facility: CLINIC | Age: 70
End: 2025-05-20
Payer: MEDICARE

## 2025-05-20 VITALS
DIASTOLIC BLOOD PRESSURE: 92 MMHG | WEIGHT: 209.4 LBS | SYSTOLIC BLOOD PRESSURE: 153 MMHG | BODY MASS INDEX: 39.57 KG/M2 | HEART RATE: 64 BPM

## 2025-05-20 DIAGNOSIS — E11.65 UNCONTROLLED TYPE 2 DIABETES MELLITUS WITH HYPERGLYCEMIA: Primary | ICD-10-CM

## 2025-05-20 DIAGNOSIS — M81.0 OSTEOPOROSIS, UNSPECIFIED OSTEOPOROSIS TYPE, UNSPECIFIED PATHOLOGICAL FRACTURE PRESENCE: ICD-10-CM

## 2025-05-20 DIAGNOSIS — E53.8 B12 DEFICIENCY: ICD-10-CM

## 2025-05-20 DIAGNOSIS — I10 ESSENTIAL HYPERTENSION: ICD-10-CM

## 2025-05-20 DIAGNOSIS — E55.9 VITAMIN D INSUFFICIENCY: ICD-10-CM

## 2025-05-20 DIAGNOSIS — E78.2 MIXED HYPERLIPIDEMIA: ICD-10-CM

## 2025-05-20 LAB — POC HEMOGLOBIN A1C: 5.4 % (ref 4.2–6.5)

## 2025-05-20 PROCEDURE — 3044F HG A1C LEVEL LT 7.0%: CPT | Performed by: INTERNAL MEDICINE

## 2025-05-20 PROCEDURE — 1036F TOBACCO NON-USER: CPT | Performed by: INTERNAL MEDICINE

## 2025-05-20 PROCEDURE — 1159F MED LIST DOCD IN RCRD: CPT | Performed by: INTERNAL MEDICINE

## 2025-05-20 PROCEDURE — 83036 HEMOGLOBIN GLYCOSYLATED A1C: CPT | Performed by: INTERNAL MEDICINE

## 2025-05-20 PROCEDURE — 99214 OFFICE O/P EST MOD 30 MIN: CPT | Performed by: INTERNAL MEDICINE

## 2025-05-20 PROCEDURE — 1126F AMNT PAIN NOTED NONE PRSNT: CPT | Performed by: INTERNAL MEDICINE

## 2025-05-20 PROCEDURE — 3077F SYST BP >= 140 MM HG: CPT | Performed by: INTERNAL MEDICINE

## 2025-05-20 PROCEDURE — 3080F DIAST BP >= 90 MM HG: CPT | Performed by: INTERNAL MEDICINE

## 2025-05-20 RX ORDER — ZOLEDRONIC ACID 5 MG/100ML
5 INJECTION, SOLUTION INTRAVENOUS ONCE
OUTPATIENT
Start: 2025-07-09

## 2025-05-20 RX ORDER — FAMOTIDINE 10 MG/ML
20 INJECTION, SOLUTION INTRAVENOUS ONCE AS NEEDED
OUTPATIENT
Start: 2025-07-09

## 2025-05-20 RX ORDER — EPINEPHRINE 0.3 MG/.3ML
0.3 INJECTION SUBCUTANEOUS EVERY 5 MIN PRN
OUTPATIENT
Start: 2025-07-09

## 2025-05-20 RX ORDER — ALBUTEROL SULFATE 0.83 MG/ML
3 SOLUTION RESPIRATORY (INHALATION) AS NEEDED
OUTPATIENT
Start: 2025-07-09

## 2025-05-20 RX ORDER — DIPHENHYDRAMINE HYDROCHLORIDE 50 MG/ML
50 INJECTION, SOLUTION INTRAMUSCULAR; INTRAVENOUS AS NEEDED
OUTPATIENT
Start: 2025-07-09

## 2025-05-20 ASSESSMENT — ENCOUNTER SYMPTOMS: DEPRESSION: 0

## 2025-05-20 ASSESSMENT — PAIN SCALES - GENERAL: PAINLEVEL_OUTOF10: 0-NO PAIN

## 2025-05-21 ENCOUNTER — SPECIALTY PHARMACY (OUTPATIENT)
Dept: PHARMACY | Facility: CLINIC | Age: 70
End: 2025-05-21

## 2025-05-22 ENCOUNTER — DOCUMENTATION (OUTPATIENT)
Dept: INFUSION THERAPY | Facility: CLINIC | Age: 70
End: 2025-05-22
Payer: MEDICARE

## 2025-05-22 NOTE — PROGRESS NOTES
"CLINICAL CLEARANCE FOR OUTPATIENT INFUSION      Patient to be scheduled for  Continuation of Reclast infusions    For Diagnosis: Osteoporosis     Labs required prior to treatment: NEED LABS DRAWN PRIOR TO APPT. ACTIVE CMP ORDER IN CHART  Lab Results   Component Value Date    CREATININE 0.70 11/25/2024      CrCl: 110.685 ML/MIN   (hold / contact prescribing provider if <35ml/min)     Serum, Corrected or Ionized Calcium: 9.1     (Serum or Corrected Calcium must be >8.6mg/dl. OR Ionized Calcium must be >1.1 mmol/L or >4.7 mg/dL (depending on resulting agency).  If below WNL - Contact prescribing provider. Labs should be drawn within 28 days of first treatment.)    Lab Results   Component Value Date    CALCIUM 9.1 11/25/2024      Lab Results   Component Value Date    ALBUMIN 4.2 11/25/2024        Calcium and Vitamin D supplement noted on medication list? No  (if no nurse to encourage discussion of supplementation at visit)    Current Outpatient Medications   Medication Instructions    ALPHA LIPOIC ACID ORAL Alpha Lipoic Acid    Autolet (OneTouch Delica Plus Lanc Dev) lancing device As directed tid    berberine/herbal complex no.18 (BERBERINE-HERBAL COMB NO.18 ORAL) Take by mouth.    blood sugar diagnostic (OneTouch Ultra Test) strip As directed test once a day    cyanocobalamin (VITAMIN B-12) 1,000 mcg, intramuscular, Weekly (0600)    ferrous sulfate 65 mg, Daily with breakfast    metFORMIN XR (GLUCOPHAGE-XR) 1,000 mg, oral, 2 times daily    pantoprazole (PROTONIX) 20 mg, oral, Daily before breakfast, Do not crush, chew, or split.    pravastatin (PRAVACHOL) 80 mg, oral, Daily    syringe with needle (Syringe 3cc/25Gx1\") 3 mL 25 gauge x 1\" syringe 12 Act, miscellaneous, Weekly (0600)    vit B complex 100 combo no.2 (B-100 COMPLEX ORAL) B-100 Complex        Is the patient receiving or have an allergy to Zometa? No  RX Allergies[1]     No obvious recent dental work per chart review. Nurse to confirm no dental within " past/next 4 weeks at encounter.    Urine Hcg test ordered prior to first infusion? Not applicable (If female pt <60 years of age and with reproductive capability)  (If not and is indicated please order)    Last infusion received: 7/8/2024 (if continuation)    Due: AFTER 7/8/2025    Labs drawn no more than 28 days prior to their scheduled appointment    Okay to schedule for treatment as ordered by prescribing provider pending labs. Please inform patient of need to have labs drawn prior to scheduled treatment. Active order for labs in chart.         [1] No Known Allergies

## 2025-06-18 ENCOUNTER — APPOINTMENT (OUTPATIENT)
Dept: CARDIOLOGY | Facility: CLINIC | Age: 70
End: 2025-06-18
Payer: MEDICARE

## 2025-06-23 ENCOUNTER — TELEPHONE (OUTPATIENT)
Dept: PRIMARY CARE | Facility: CLINIC | Age: 70
End: 2025-06-23
Payer: MEDICARE

## 2025-06-23 DIAGNOSIS — G89.29 CHRONIC BILATERAL LOW BACK PAIN WITHOUT SCIATICA: Primary | ICD-10-CM

## 2025-06-23 DIAGNOSIS — M54.50 CHRONIC BILATERAL LOW BACK PAIN WITHOUT SCIATICA: Primary | ICD-10-CM

## 2025-06-24 NOTE — TELEPHONE ENCOUNTER
Rx printed, signed.  On Floresita's desk.  Please notify pt and make available for pickup or mail per her preference.

## 2025-07-02 ENCOUNTER — OFFICE VISIT (OUTPATIENT)
Dept: CARDIOLOGY | Facility: CLINIC | Age: 70
End: 2025-07-02
Payer: MEDICARE

## 2025-07-02 VITALS — DIASTOLIC BLOOD PRESSURE: 80 MMHG | RESPIRATION RATE: 16 BRPM | SYSTOLIC BLOOD PRESSURE: 124 MMHG | HEART RATE: 73 BPM

## 2025-07-02 DIAGNOSIS — I35.0 NONRHEUMATIC AORTIC VALVE STENOSIS: Primary | ICD-10-CM

## 2025-07-02 PROCEDURE — 3079F DIAST BP 80-89 MM HG: CPT | Performed by: INTERNAL MEDICINE

## 2025-07-02 PROCEDURE — 1126F AMNT PAIN NOTED NONE PRSNT: CPT | Performed by: INTERNAL MEDICINE

## 2025-07-02 PROCEDURE — 1036F TOBACCO NON-USER: CPT | Performed by: INTERNAL MEDICINE

## 2025-07-02 PROCEDURE — 3044F HG A1C LEVEL LT 7.0%: CPT | Performed by: INTERNAL MEDICINE

## 2025-07-02 PROCEDURE — 99213 OFFICE O/P EST LOW 20 MIN: CPT | Performed by: INTERNAL MEDICINE

## 2025-07-02 PROCEDURE — 3074F SYST BP LT 130 MM HG: CPT | Performed by: INTERNAL MEDICINE

## 2025-07-02 PROCEDURE — 1159F MED LIST DOCD IN RCRD: CPT | Performed by: INTERNAL MEDICINE

## 2025-07-02 ASSESSMENT — ENCOUNTER SYMPTOMS
LOSS OF SENSATION IN FEET: 0
DEPRESSION: 0
OCCASIONAL FEELINGS OF UNSTEADINESS: 0

## 2025-07-02 ASSESSMENT — PAIN SCALES - GENERAL: PAINLEVEL_OUTOF10: 0-NO PAIN

## 2025-07-02 ASSESSMENT — PATIENT HEALTH QUESTIONNAIRE - PHQ9
1. LITTLE INTEREST OR PLEASURE IN DOING THINGS: NOT AT ALL
SUM OF ALL RESPONSES TO PHQ9 QUESTIONS 1 AND 2: 0
2. FEELING DOWN, DEPRESSED OR HOPELESS: NOT AT ALL

## 2025-07-02 NOTE — PROGRESS NOTES
Primary Care Physician: Nette Duckworth MD   Date of Visit: 07/02/2025 10:00 AM EDT  Type of Visit: New patient visit    Chief Complaint:  Chief Complaint   Patient presents with    Follow-up     1 year follow-up, doing well, no issues or concerns        HPI  Jil Harper 70 y.o. female who presents for follow-up.    Doing well. Denies angina, shortness of breath, paroxysmal nocturnal dyspnea, orthopnea, peripheral edema, near-syncope, syncope, or palpitations.    No family history of early coronary artery disease or stroke.  Brother with significant peripheral arterial disease.    Review of Systems   12 point review of systems was obtained in detail and is negative other than that noted above or below.       Past Medical/Surgical History:  Jil has a past medical history of Abnormal ECG, B12 deficiency, Essential hypertension, Heart murmur (2021), Hypertension, Mixed hyperlipidemia, Osteoporosis, unspecified osteoporosis type, unspecified pathological fracture presence, and Uncontrolled type 2 diabetes mellitus with hyperglycemia.    Jil has a past surgical history that includes Leg Surgery; Tonsillectomy (Bilateral); and Cataract extraction (2018).    Social/Family History:  Jil reports that she has never smoked. She has never been exposed to tobacco smoke. She has never used smokeless tobacco. She reports that she does not drink alcohol and does not use drugs.    Jil's family history includes Cancer in her father; Diabetes in her mother and paternal grandmother; Diabetes type II in her mother; asbestos of the lung in her father.    Allergies:  No Known Allergies    Medications:  Current Outpatient Medications   Medication Sig Dispense Refill    ALPHA LIPOIC ACID ORAL Alpha Lipoic Acid      Autolet (OneTouch Delica Plus Lanc Dev) lancing device As directed tid      berberine/herbal complex no.18 (BERBERINE-HERBAL COMB NO.18 ORAL) Take by mouth.      blood sugar diagnostic (OneTouch Ultra Test)  "strip As directed test once a day 100 strip 3    metFORMIN  mg 24 hr tablet Take 2 tablets (1,000 mg) by mouth 2 times a day. 360 tablet 2    pantoprazole (Protonix) 20 mg EC tablet Take 1 tablet (20 mg) by mouth once daily in the morning. Take before meals. Do not crush, chew, or split. (Patient taking differently: Take 1 tablet (20 mg) by mouth if needed for heartburn. Do not crush, chew, or split.) 30 tablet 2    pravastatin (Pravachol) 80 mg tablet Take 1 tablet (80 mg) by mouth once daily. 90 tablet 3    syringe with needle (Syringe 3cc/25Gx1\") 3 mL 25 gauge x 1\" syringe 12 Act 1 (one) time per week in the early morning.. 12 each 0    vit B complex 100 combo no.2 (B-100 COMPLEX ORAL) B-100 Complex      cyanocobalamin (Vitamin B-12) 1,000 mcg/mL injection Inject 1 mL (1,000 mcg) into the muscle 1 (one) time per week in the early morning.. (Patient not taking: Reported on 7/2/2025) 12 mL 0    ferrous sulfate 325 (65 Fe) MG EC tablet Take 65 mg by mouth once daily with breakfast. Do not crush, chew, or split. (Patient not taking: Reported on 7/2/2025)       No current facility-administered medications for this visit.       Objective:   Vitals:    07/02/25 1008   BP: 124/80   Pulse: 73   Resp: 16         S1-S2 normal, systolic ejection murmur, no rubs or gallops.  JVD normal.  No carotid bruit.  2+ dorsalis pedis pulses bilaterally.  1+ posterior tibial pulses bilaterally.  Clear to auscultation bilaterally    Labs and Imaging:      Latest Ref Rng & Units 2/28/2020    11:40 AM 1/19/2021    11:52 AM 3/17/2022    11:49 AM 5/31/2023     7:27 AM 4/16/2024     2:31 PM 6/7/2024     1:29 PM 11/25/2024     2:55 PM   Electrolytes   Na 136 - 145 mmol/L 144  147  143  143  144  144  141    K 3.5 - 5.3 mmol/L 4.3  3.9  4.2  3.8  4.1  4.4  4.1    Cl 98 - 107 mmol/L 106  108  105  107  106  108  107    CO2 21 - 32 mmol/L 25  27  26  25  26  23  27    Cr 0.50 - 1.05 mg/dL 0.7  0.8  0.7  0.8  0.70  0.80  0.70    Ca 8.6 - " 10.3 mg/dL 9.5  9.2  9.1  9.1  9.1  9.0  9.1          Latest Ref Rng & Units 7/6/2019     9:02 AM 3/17/2022    11:49 AM 5/31/2023     7:27 AM 4/16/2024     2:31 PM 6/7/2024     1:29 PM 11/25/2024     2:55 PM   CBC   Hb 12.0 - 16.0 g/dL 15.1  12.7  13.0  10.0  10.4  14.4    Hct 36.0 - 46.0 % 45.7  38.1  40.1  33.3  33.8  43.8    MCV 80 - 100 fL 82.9  84.1  83.5  79  77  85    RDW 11.5 - 14.5 %    16.5  17.2  15.6          Latest Ref Rng & Units 2/28/2020    11:40 AM 1/19/2021    11:52 AM 3/17/2022    11:49 AM 5/31/2023     7:27 AM 4/16/2024     2:31 PM 6/7/2024     1:29 PM 11/25/2024     2:55 PM   Lipids   Chol 0 - 199 mg/dL 133  154  164  179  171   142    HDL mg/dL 60  57  76  68  65.0   65.8    LDL <=99 mg/dL 51  76  72  96  83   62    Trig 0 - 149 mg/dL 110  107  80  76  115   73    ALT 7 - 45 U/L 13  8  10  9  8  8  10    AST 9 - 39 U/L 13  13  14  11  12  12  13          Latest Ref Rng & Units 7/6/2019     9:02 AM 4/16/2024     2:31 PM   Thyroid   TSH 0.27 - 4.20 mIU/L 2.18  2.02           No data to display                 Lab Results   Component Value Date    HGBA1C 5.4 05/20/2025    HGBA1C 5.8 11/20/2024    HGBA1C 5.9 05/20/2024    ALBUR 12 05/31/2023    ALBUR 12 01/19/2021    ALBUR 12 02/28/2020        The 10-year ASCVD risk score (Roverto ISAAC, et al., 2019) is: 16.5%    Values used to calculate the score:      Age: 70 years      Sex: Female      Is Non- : No      Diabetic: Yes      Tobacco smoker: No      Systolic Blood Pressure: 124 mmHg      Is BP treated: No      HDL Cholesterol: 65.8 mg/dL      Total Cholesterol: 142 mg/dL     Echocardiogram: No results found for this or any previous visit.     Echocardiogram: No results found for this or any previous visit from the past 1825 days.    Stress Testing: No results found for this or any previous visit from the past 1825 days.    Cardiac Catheterization: No results found for this or any previous visit from the past 1825  days.    Cardiac Scoring: No results found for this or any previous visit from the past 1825 days.    AAA : No results found for this or any previous visit from the past 1825 days.    OTHER: No results found for this or any previous visit from the past 1825 days.        Assessment/Plan:   No diagnosis found.       Jil Harper 70 y.o. female who presents to establish care:    1.  Mild aortic stenosis  2.  Hypertension  3.  Peripheral arterial disease  4.  Type 2 diabetes mellitus    Doing well with adequate risk factor control.     Return to clinic in one year, echocardiogram in [id number], as long as she remains asymptomatic.    ____________________________________________________________  Allen Randall MD

## 2025-07-09 DIAGNOSIS — M81.0 OSTEOPOROSIS, UNSPECIFIED OSTEOPOROSIS TYPE, UNSPECIFIED PATHOLOGICAL FRACTURE PRESENCE: ICD-10-CM

## 2025-07-11 LAB
25(OH)D3+25(OH)D2 SERPL-MCNC: 32 NG/ML (ref 30–100)
ALBUMIN SERPL-MCNC: 4.1 G/DL (ref 3.6–5.1)
ALBUMIN/CREAT UR: 11 MG/G CREAT
ALP SERPL-CCNC: 41 U/L (ref 37–153)
ALT SERPL-CCNC: 10 U/L (ref 6–29)
ANION GAP SERPL CALCULATED.4IONS-SCNC: 7 MMOL/L (CALC) (ref 7–17)
AST SERPL-CCNC: 13 U/L (ref 10–35)
BASOPHILS # BLD AUTO: 32 CELLS/UL (ref 0–200)
BASOPHILS NFR BLD AUTO: 0.7 %
BILIRUB SERPL-MCNC: 1.3 MG/DL (ref 0.2–1.2)
BUN SERPL-MCNC: 18 MG/DL (ref 7–25)
CALCIUM SERPL-MCNC: 9.2 MG/DL (ref 8.6–10.4)
CHLORIDE SERPL-SCNC: 105 MMOL/L (ref 98–110)
CHOLEST SERPL-MCNC: 147 MG/DL
CHOLEST/HDLC SERPL: 2.2 (CALC)
CO2 SERPL-SCNC: 30 MMOL/L (ref 20–32)
CREAT SERPL-MCNC: 0.68 MG/DL (ref 0.6–1)
CREAT UR-MCNC: 105 MG/DL (ref 20–275)
EGFRCR SERPLBLD CKD-EPI 2021: 93 ML/MIN/1.73M2
EOSINOPHIL # BLD AUTO: 162 CELLS/UL (ref 15–500)
EOSINOPHIL NFR BLD AUTO: 3.6 %
ERYTHROCYTE [DISTWIDTH] IN BLOOD BY AUTOMATED COUNT: 14.6 % (ref 11–15)
GLUCOSE SERPL-MCNC: 83 MG/DL (ref 65–99)
HCT VFR BLD AUTO: 45.2 % (ref 35–45)
HDLC SERPL-MCNC: 68 MG/DL
HGB BLD-MCNC: 14.3 G/DL (ref 11.7–15.5)
LDLC SERPL CALC-MCNC: 62 MG/DL (CALC)
LYMPHOCYTES # BLD AUTO: 1341 CELLS/UL (ref 850–3900)
LYMPHOCYTES NFR BLD AUTO: 29.8 %
MCH RBC QN AUTO: 28.6 PG (ref 27–33)
MCHC RBC AUTO-ENTMCNC: 31.6 G/DL (ref 32–36)
MCV RBC AUTO: 90.4 FL (ref 80–100)
MICROALBUMIN UR-MCNC: 1.2 MG/DL
MONOCYTES # BLD AUTO: 414 CELLS/UL (ref 200–950)
MONOCYTES NFR BLD AUTO: 9.2 %
NEUTROPHILS # BLD AUTO: 2552 CELLS/UL (ref 1500–7800)
NEUTROPHILS NFR BLD AUTO: 56.7 %
NONHDLC SERPL-MCNC: 79 MG/DL (CALC)
PLATELET # BLD AUTO: 184 THOUSAND/UL (ref 140–400)
PMV BLD REES-ECKER: 11.3 FL (ref 7.5–12.5)
POTASSIUM SERPL-SCNC: 4.1 MMOL/L (ref 3.5–5.3)
PROT SERPL-MCNC: 6 G/DL (ref 6.1–8.1)
RBC # BLD AUTO: 5 MILLION/UL (ref 3.8–5.1)
SODIUM SERPL-SCNC: 142 MMOL/L (ref 135–146)
TRIGL SERPL-MCNC: 88 MG/DL
VIT B12 SERPL-MCNC: 298 PG/ML (ref 200–1100)
WBC # BLD AUTO: 4.5 THOUSAND/UL (ref 3.8–10.8)

## 2025-07-14 DIAGNOSIS — E78.2 MIXED HYPERLIPIDEMIA: ICD-10-CM

## 2025-07-14 RX ORDER — PRAVASTATIN SODIUM 80 MG/1
80 TABLET ORAL DAILY
Qty: 90 TABLET | Refills: 3 | Status: SHIPPED | OUTPATIENT
Start: 2025-07-14 | End: 2026-07-14

## 2025-07-18 ENCOUNTER — APPOINTMENT (OUTPATIENT)
Dept: INFUSION THERAPY | Facility: CLINIC | Age: 70
End: 2025-07-18
Payer: MEDICARE

## 2025-07-18 VITALS
TEMPERATURE: 96.6 F | OXYGEN SATURATION: 98 % | RESPIRATION RATE: 16 BRPM | SYSTOLIC BLOOD PRESSURE: 130 MMHG | HEART RATE: 66 BPM | WEIGHT: 209 LBS | DIASTOLIC BLOOD PRESSURE: 67 MMHG | BODY MASS INDEX: 39.49 KG/M2

## 2025-07-18 DIAGNOSIS — M81.0 OSTEOPOROSIS, UNSPECIFIED OSTEOPOROSIS TYPE, UNSPECIFIED PATHOLOGICAL FRACTURE PRESENCE: ICD-10-CM

## 2025-07-18 RX ORDER — EPINEPHRINE 0.3 MG/.3ML
0.3 INJECTION SUBCUTANEOUS EVERY 5 MIN PRN
OUTPATIENT
Start: 2025-07-18

## 2025-07-18 RX ORDER — DIPHENHYDRAMINE HYDROCHLORIDE 50 MG/ML
50 INJECTION, SOLUTION INTRAMUSCULAR; INTRAVENOUS AS NEEDED
OUTPATIENT
Start: 2025-07-18

## 2025-07-18 RX ORDER — FAMOTIDINE 10 MG/ML
20 INJECTION, SOLUTION INTRAVENOUS ONCE AS NEEDED
OUTPATIENT
Start: 2025-07-18

## 2025-07-18 RX ORDER — ZOLEDRONIC ACID 5 MG/100ML
5 INJECTION, SOLUTION INTRAVENOUS ONCE
Status: CANCELLED | OUTPATIENT
Start: 2025-07-18

## 2025-07-18 RX ORDER — ZOLEDRONIC ACID 5 MG/100ML
5 INJECTION, SOLUTION INTRAVENOUS ONCE
Status: COMPLETED | OUTPATIENT
Start: 2025-07-18 | End: 2025-07-18

## 2025-07-18 RX ORDER — ALBUTEROL SULFATE 0.83 MG/ML
3 SOLUTION RESPIRATORY (INHALATION) AS NEEDED
OUTPATIENT
Start: 2025-07-18

## 2025-07-18 RX ADMIN — ZOLEDRONIC ACID 5 MG: 5 INJECTION, SOLUTION INTRAVENOUS at 11:36

## 2025-07-18 ASSESSMENT — ENCOUNTER SYMPTOMS
COUGH: 0
PALPITATIONS: 0
WHEEZING: 0
VOICE CHANGE: 0
EYE PROBLEMS: 1
NERVOUS/ANXIOUS: 0
BRUISES/BLEEDS EASILY: 0
FEVER: 0
BLOOD IN STOOL: 0
EXTREMITY WEAKNESS: 0
HEADACHES: 0
CONSTIPATION: 0
LEG SWELLING: 0
VOMITING: 0
SORE THROAT: 0
CHILLS: 0
TROUBLE SWALLOWING: 0
MYALGIAS: 0
DIZZINESS: 0
ARTHRALGIAS: 0
UNEXPECTED WEIGHT CHANGE: 0
NUMBNESS: 1
FATIGUE: 0
APPETITE CHANGE: 0
NAUSEA: 0
DIARRHEA: 0
DEPRESSION: 0
FREQUENCY: 0
LIGHT-HEADEDNESS: 0
ABDOMINAL PAIN: 0
HEMATURIA: 0
WOUND: 0
SHORTNESS OF BREATH: 0
DYSURIA: 0

## 2025-07-18 ASSESSMENT — PAIN SCALES - GENERAL: PAINLEVEL_OUTOF10: 0-NO PAIN

## 2025-07-18 NOTE — PROGRESS NOTES
[facility]   Infusion Clinic Note   Date: 2025   Name: Jil Harper  : 1955   MRN: [id number]         Reason for Visit: Follow-up and OP Infusion (PT HERE FOR RECLAST 5 MG INFUSION EVERY YEAR)         Today: We administered zoledronic acid.       Ordered By: Abebe Willingham MD       For a Diagnosis of: Osteoporosis, unspecified osteoporosis type, unspecified pathological fracture presence       At today's visit patient accompanied by: Self      Today's Vitals:   Vitals:    25 1103 25 1153   BP: 143/74 130/67   Pulse: 67 66   Resp: 18 16   Temp: 36.4 °C (97.5 °F) 35.9 °C (96.6 °F)   SpO2: 98%    Weight: 94.8 kg (209 lb)    PainSc: 0-No pain              Pre - Treatment Checklist:      - Previous reaction to current treatment: no      (Assess patient for the concerns below. Document provider notification as appropriate).  - Active or recent infection with/without current antibiotic use: no  - Recent or planned invasive dental work: no  - Recent or planned surgeries: no  - Recently received or plans to receive vaccinations: no  - Has treatment related toxicities: no  - Any chance may be pregnant:  n/a      Pain: 0   - Is the pain different from normal: n/a   - Is prescribing Doctor aware:  n/a      Labs: Reviewed       Fall Risk Screening: Arabella Fall Risk  History of Falling, Immediate or Within 3 Months: No  Secondary Diagnosis: No  Ambulatory Aid: Walks without aid/bedrest/nurse assist  Intravenous Therapy/Heparin Lock: Yes  Gait/Transferring: Normal/bedrest/immobile  Mental Status: Oriented to own ability  Bell Fall Risk Score: 20            Review Of Systems:  Review of Systems   Constitutional:  Negative for appetite change, chills, fatigue, fever and unexpected weight change.   HENT:   Negative for hearing loss, mouth sores, sore throat, tinnitus, trouble swallowing and voice change.    Eyes:  Positive for eye problems.        EYE INJECTIONS EVERY 6 WEEKS  "  Respiratory:  Negative for cough, shortness of breath and wheezing.    Cardiovascular:  Negative for chest pain, leg swelling and palpitations.   Gastrointestinal:  Negative for abdominal pain, blood in stool, constipation, diarrhea, nausea and vomiting.   Genitourinary:  Negative for dysuria, frequency and hematuria.    Musculoskeletal:  Negative for arthralgias and myalgias.   Skin:  Negative for itching, rash and wound.   Neurological:  Positive for numbness. Negative for dizziness, extremity weakness, headaches and light-headedness.        FEET, MANAGED WITH MEDICATION   Hematological:  Does not bruise/bleed easily.   Psychiatric/Behavioral:  Negative for depression. The patient is not nervous/anxious.          Infusion Readiness:  - Assessment Concerns Related to Infusion: No  - Provider notified: n/a      New Patient Education:    N/A (returning patient for continuation of therapy. Ongoing education provided as needed.)        Treatment Conditions & Drug Specific Questions:    Zoledronic Acid  (RECLAST)    (Unless otherwise specified on patient specific therapy plan):     TREATMENT CONDITIONS:  Unless otherwise specified on patient specific therapy plan HOLD and notify provider prior to proceeding with treatment if:   o Creatinine clearance LESS THAN 35 mL/Minute  o Corrected or Serum Calcium LESS THAN 8.6 mg/dL  OR Ionized calcium less than 1.1 mmol/L or  less than 4.7 mg/dL (depending on resulting agency)  o Recent (within 4 weeks) or planned invasive dental procedure.  -           Positive Pregnancy     Lab Results   Component Value Date    CREATININE 0.68 07/10/2025      Lab Results   Component Value Date    CALCIUM 9.2 07/10/2025      No results found for: \"CAION\"    CrCl: 113.39 ML/MIN  Corrected calcium: ALBUMIN 4.1    Patient meets treatment conditions? Yes    DRUG SPECIFIC QUESTIONS:  Is the patient taking a Calcium and Vitamin D supplement?  Yes  (Recommended)    Is the patient receiving Zometa or " do they have an allergy to Zometa?  No    Is the patient aware of the adverse effects which may include bone fractures, hypocalcemia, influenza-like illness, musculoskeletal pain, ocular inflammation, and osteonecrosis of the jaw? Yes      REMINDERS:  PREGNANCY CATEGORY X DRUG. OBTAIN NEGATIVE PREGNANCY TEST PRIOR TO FIRST INFUSION FOR WOMEN OF CHILDBEARING ABILITY     Recommended Vitals/Observation:  Monitor vital signs before infusion, at the end of the infusion and as needed        Weight Based Drug Calculations:    WEIGHT BASED DRUGS: NOT APPLICABLE / FLAT DOSE       Post Treatment: Patient tolerated treatment without issue and was discharged in no apparent distress.      Note Authored / Patient Cared for By: Ramona Hyatt RN   _________________________________________________________________________    Note authored and patient cared for by: Ramona Hyatt RN   Note/Encounter reviewed by: Patricia VARGAS NP. This provider on site at time of patient infusion. Infusion staff to notify this provider of any questions, concerns, abnormals or issues during infusion.    Final check of medication completed by this SAM / or on-site pharmacist with administering nurse using positive identification prior to administration. Final appearance of product checked for accuracy and conformity to the formula of the prepared product. Assured use of correct ingredients, accurate calculations and precise measurements under appropriate conditions and procedures.    No issues reported during today's encounter. Pt. tolerated infusion without difficulty. Pt. not independently evaluated by this provider during today's encounter.  (Patricia VARGAS NP)

## 2025-07-18 NOTE — PATIENT INSTRUCTIONS
Today :We administered zoledronic acid.     For:   1. Osteoporosis, unspecified osteoporosis type, unspecified pathological fracture presence         Your next appointment is due in:  ONE YEAR        Please read the  Medication Guide that was given to you and reviewed during todays visit.     (Tell all doctors including dentists that you are taking this medication)     Go to the emergency room or call 911 if:  -You have signs of allergic reaction:   -Rash, hives, itching.   -Swollen, blistered, peeling skin.   -Swelling of face, lips, mouth, tongue or throat.   -Tightness of chest, trouble breathing, swallowing or talking     Call your doctor:  - If IV / injection site gets red, warm, swollen, itchy or leaks fluid or pus.     (Leave dressing on your IV site for at least 2 hours and keep area clean and dry  - If you get sick or have symptoms of infection or are not feeling well for any reason.    (Wash your hands often, stay away from people who are sick)  - If you have side effects from your medication that do not go away or are bothersome.     (Refer to the teaching your nurse gave you for side effects to call your doctor about)    - Common side effects may include:  stuffy nose, headache, feeling tired, muscle aches, upset stomach  - Before receiving any vaccines     - Call the [facility] at   If:  - You get sick, are on antibiotics, have had a recent vaccine, have surgery or dental work and your doctor wants your visit rescheduled.  - You need to cancel and reschedule your visit for any reason. Call at least 2 days before your visit if you need to cancel.   - Your insurance changes before your next visit.    (We will need to get approval from your new insurance. This can take up to two weeks.)     The [facility] is opened Monday thru Friday. We are closed on weekends and holidays.   Voice mail will take your call if the center is closed. If you leave a message please allow 24  hours for a call back during weekdays. If you leave a message on a weekend/holiday, we will call you back the next business day.    A pharmacist is available Monday - Friday from 8:30AM to 3:30PM to help answer any questions you may have about your prescriptions(s). Please call pharmacy at:    [facility]: [phone]  [facility]: [phone]  [facility]: [phone]

## 2025-07-31 DIAGNOSIS — E11.65 UNCONTROLLED TYPE 2 DIABETES MELLITUS WITH HYPERGLYCEMIA: ICD-10-CM

## 2025-08-01 RX ORDER — METFORMIN HYDROCHLORIDE 500 MG/1
1000 TABLET, EXTENDED RELEASE ORAL 2 TIMES DAILY
Qty: 360 TABLET | Refills: 2 | Status: SHIPPED | OUTPATIENT
Start: 2025-08-01

## 2025-08-14 ENCOUNTER — OFFICE VISIT (OUTPATIENT)
Dept: PRIMARY CARE | Facility: CLINIC | Age: 70
End: 2025-08-14
Payer: MEDICARE

## 2025-08-14 VITALS
WEIGHT: 209 LBS | SYSTOLIC BLOOD PRESSURE: 138 MMHG | DIASTOLIC BLOOD PRESSURE: 80 MMHG | OXYGEN SATURATION: 99 % | HEART RATE: 74 BPM | BODY MASS INDEX: 39.46 KG/M2 | HEIGHT: 61 IN | TEMPERATURE: 97.1 F

## 2025-08-14 DIAGNOSIS — S32.020S CLOSED COMPRESSION FRACTURE OF L2 LUMBAR VERTEBRA, SEQUELA: ICD-10-CM

## 2025-08-14 DIAGNOSIS — E78.2 MIXED HYPERLIPIDEMIA: ICD-10-CM

## 2025-08-14 DIAGNOSIS — D50.9 IRON DEFICIENCY ANEMIA, UNSPECIFIED IRON DEFICIENCY ANEMIA TYPE: ICD-10-CM

## 2025-08-14 DIAGNOSIS — M81.0 OSTEOPOROSIS, UNSPECIFIED OSTEOPOROSIS TYPE, UNSPECIFIED PATHOLOGICAL FRACTURE PRESENCE: ICD-10-CM

## 2025-08-14 DIAGNOSIS — Z12.31 SCREENING MAMMOGRAM FOR BREAST CANCER: ICD-10-CM

## 2025-08-14 DIAGNOSIS — Z00.00 ANNUAL PHYSICAL EXAM: ICD-10-CM

## 2025-08-14 DIAGNOSIS — Z00.00 MEDICARE ANNUAL WELLNESS VISIT, SUBSEQUENT: Primary | ICD-10-CM

## 2025-08-14 DIAGNOSIS — R29.898 LEFT LEG WEAKNESS: ICD-10-CM

## 2025-08-14 DIAGNOSIS — E66.812 CLASS 2 SEVERE OBESITY WITH SERIOUS COMORBIDITY AND BODY MASS INDEX (BMI) OF 39.0 TO 39.9 IN ADULT, UNSPECIFIED OBESITY TYPE: ICD-10-CM

## 2025-08-14 DIAGNOSIS — K21.9 GASTROESOPHAGEAL REFLUX DISEASE, UNSPECIFIED WHETHER ESOPHAGITIS PRESENT: ICD-10-CM

## 2025-08-14 DIAGNOSIS — E11.319 TYPE 2 DIABETES MELLITUS WITH RETINOPATHY, WITHOUT LONG-TERM CURRENT USE OF INSULIN, MACULAR EDEMA PRESENCE UNSPECIFIED, UNSPECIFIED LATERALITY, UNSPECIFIED RETINOPATHY SEVERITY: ICD-10-CM

## 2025-08-14 DIAGNOSIS — E66.01 CLASS 2 SEVERE OBESITY WITH SERIOUS COMORBIDITY AND BODY MASS INDEX (BMI) OF 39.0 TO 39.9 IN ADULT, UNSPECIFIED OBESITY TYPE: ICD-10-CM

## 2025-08-14 DIAGNOSIS — E11.42 DIABETIC PERIPHERAL NEUROPATHY ASSOCIATED WITH TYPE 2 DIABETES MELLITUS: ICD-10-CM

## 2025-08-14 PROCEDURE — 3044F HG A1C LEVEL LT 7.0%: CPT | Performed by: STUDENT IN AN ORGANIZED HEALTH CARE EDUCATION/TRAINING PROGRAM

## 2025-08-14 PROCEDURE — 3075F SYST BP GE 130 - 139MM HG: CPT | Performed by: STUDENT IN AN ORGANIZED HEALTH CARE EDUCATION/TRAINING PROGRAM

## 2025-08-14 PROCEDURE — 1159F MED LIST DOCD IN RCRD: CPT | Performed by: STUDENT IN AN ORGANIZED HEALTH CARE EDUCATION/TRAINING PROGRAM

## 2025-08-14 PROCEDURE — 99215 OFFICE O/P EST HI 40 MIN: CPT | Performed by: STUDENT IN AN ORGANIZED HEALTH CARE EDUCATION/TRAINING PROGRAM

## 2025-08-14 PROCEDURE — 1126F AMNT PAIN NOTED NONE PRSNT: CPT | Performed by: STUDENT IN AN ORGANIZED HEALTH CARE EDUCATION/TRAINING PROGRAM

## 2025-08-14 PROCEDURE — 3008F BODY MASS INDEX DOCD: CPT | Performed by: STUDENT IN AN ORGANIZED HEALTH CARE EDUCATION/TRAINING PROGRAM

## 2025-08-14 PROCEDURE — G0439 PPPS, SUBSEQ VISIT: HCPCS | Performed by: STUDENT IN AN ORGANIZED HEALTH CARE EDUCATION/TRAINING PROGRAM

## 2025-08-14 PROCEDURE — 99214 OFFICE O/P EST MOD 30 MIN: CPT | Performed by: STUDENT IN AN ORGANIZED HEALTH CARE EDUCATION/TRAINING PROGRAM

## 2025-08-14 PROCEDURE — 99214 OFFICE O/P EST MOD 30 MIN: CPT | Mod: 25 | Performed by: STUDENT IN AN ORGANIZED HEALTH CARE EDUCATION/TRAINING PROGRAM

## 2025-08-14 PROCEDURE — 3079F DIAST BP 80-89 MM HG: CPT | Performed by: STUDENT IN AN ORGANIZED HEALTH CARE EDUCATION/TRAINING PROGRAM

## 2025-08-14 RX ORDER — PANTOPRAZOLE SODIUM 20 MG/1
20 TABLET, DELAYED RELEASE ORAL
Qty: 90 TABLET | Refills: 1 | Status: SHIPPED | OUTPATIENT
Start: 2025-08-14 | End: 2026-02-10

## 2025-08-14 ASSESSMENT — PATIENT HEALTH QUESTIONNAIRE - PHQ9
6. FEELING BAD ABOUT YOURSELF - OR THAT YOU ARE A FAILURE OR HAVE LET YOURSELF OR YOUR FAMILY DOWN: NOT AT ALL
8. MOVING OR SPEAKING SO SLOWLY THAT OTHER PEOPLE COULD HAVE NOTICED. OR THE OPPOSITE, BEING SO FIGETY OR RESTLESS THAT YOU HAVE BEEN MOVING AROUND A LOT MORE THAN USUAL: NOT AT ALL
5. POOR APPETITE OR OVEREATING: NOT AT ALL
9. THOUGHTS THAT YOU WOULD BE BETTER OFF DEAD, OR OF HURTING YOURSELF: NOT AT ALL
SUM OF ALL RESPONSES TO PHQ9 QUESTIONS 1 AND 2: 0
1. LITTLE INTEREST OR PLEASURE IN DOING THINGS: NOT AT ALL
4. FEELING TIRED OR HAVING LITTLE ENERGY: NOT AT ALL
7. TROUBLE CONCENTRATING ON THINGS, SUCH AS READING THE NEWSPAPER OR WATCHING TELEVISION: NOT AT ALL
3. TROUBLE FALLING OR STAYING ASLEEP OR SLEEPING TOO MUCH: NOT AT ALL
2. FEELING DOWN, DEPRESSED OR HOPELESS: NOT AT ALL
SUM OF ALL RESPONSES TO PHQ QUESTIONS 1-9: 0

## 2025-08-14 ASSESSMENT — ANXIETY QUESTIONNAIRES
2. NOT BEING ABLE TO STOP OR CONTROL WORRYING: NOT AT ALL
GAD7 TOTAL SCORE: 0
7. FEELING AFRAID AS IF SOMETHING AWFUL MIGHT HAPPEN: NOT AT ALL
3. WORRYING TOO MUCH ABOUT DIFFERENT THINGS: NOT AT ALL
1. FEELING NERVOUS, ANXIOUS, OR ON EDGE: NOT AT ALL
IF YOU CHECKED OFF ANY PROBLEMS ON THIS QUESTIONNAIRE, HOW DIFFICULT HAVE THESE PROBLEMS MADE IT FOR YOU TO DO YOUR WORK, TAKE CARE OF THINGS AT HOME, OR GET ALONG WITH OTHER PEOPLE: NOT DIFFICULT AT ALL
3. WORRYING TOO MUCH ABOUT DIFFERENT THINGS: SEVERAL DAYS
GAD7 TOTAL SCORE: 3
IF YOU CHECKED OFF ANY PROBLEMS ON THIS QUESTIONNAIRE, HOW DIFFICULT HAVE THESE PROBLEMS MADE IT FOR YOU TO DO YOUR WORK, TAKE CARE OF THINGS AT HOME, OR GET ALONG WITH OTHER PEOPLE: NOT DIFFICULT AT ALL
1. FEELING NERVOUS, ANXIOUS, OR ON EDGE: SEVERAL DAYS
4. TROUBLE RELAXING: NOT AT ALL
6. BECOMING EASILY ANNOYED OR IRRITABLE: NOT AT ALL
7. FEELING AFRAID AS IF SOMETHING AWFUL MIGHT HAPPEN: NOT AT ALL
2. NOT BEING ABLE TO STOP OR CONTROL WORRYING: SEVERAL DAYS
5. BEING SO RESTLESS THAT IT IS HARD TO SIT STILL: NOT AT ALL
6. BECOMING EASILY ANNOYED OR IRRITABLE: NOT AT ALL
4. TROUBLE RELAXING: NOT AT ALL
5. BEING SO RESTLESS THAT IT IS HARD TO SIT STILL: NOT AT ALL

## 2025-08-14 ASSESSMENT — PAIN SCALES - GENERAL: PAINLEVEL_OUTOF10: 0-NO PAIN

## 2025-08-26 LAB
ALBUMIN SERPL-MCNC: 4.3 G/DL (ref 3.6–5.1)
ALP SERPL-CCNC: 39 U/L (ref 37–153)
ALT SERPL-CCNC: 9 U/L (ref 6–29)
ANION GAP SERPL CALCULATED.4IONS-SCNC: 11 MMOL/L (CALC) (ref 7–17)
AST SERPL-CCNC: 11 U/L (ref 10–35)
BILIRUB SERPL-MCNC: 1.2 MG/DL (ref 0.2–1.2)
BUN SERPL-MCNC: 15 MG/DL (ref 7–25)
CALCIUM SERPL-MCNC: 8.7 MG/DL (ref 8.6–10.4)
CHLORIDE SERPL-SCNC: 107 MMOL/L (ref 98–110)
CO2 SERPL-SCNC: 25 MMOL/L (ref 20–32)
CREAT SERPL-MCNC: 0.81 MG/DL (ref 0.6–1)
EGFRCR SERPLBLD CKD-EPI 2021: 78 ML/MIN/1.73M2
ERYTHROCYTE [DISTWIDTH] IN BLOOD BY AUTOMATED COUNT: 14.7 % (ref 11–15)
FERRITIN SERPL-MCNC: 63 NG/ML (ref 16–288)
GLUCOSE SERPL-MCNC: 118 MG/DL (ref 65–99)
HCT VFR BLD AUTO: 42 % (ref 35–45)
HGB BLD-MCNC: 13.6 G/DL (ref 11.7–15.5)
IRON SATN MFR SERPL: 36 % (CALC) (ref 16–45)
IRON SERPL-MCNC: 120 MCG/DL (ref 45–160)
MCH RBC QN AUTO: 28.9 PG (ref 27–33)
MCHC RBC AUTO-ENTMCNC: 32.4 G/DL (ref 32–36)
MCV RBC AUTO: 89.4 FL (ref 80–100)
PLATELET # BLD AUTO: 206 THOUSAND/UL (ref 140–400)
PMV BLD REES-ECKER: 11.6 FL (ref 7.5–12.5)
POTASSIUM SERPL-SCNC: 4.5 MMOL/L (ref 3.5–5.3)
PROT SERPL-MCNC: 6.3 G/DL (ref 6.1–8.1)
RBC # BLD AUTO: 4.7 MILLION/UL (ref 3.8–5.1)
SODIUM SERPL-SCNC: 143 MMOL/L (ref 135–146)
TIBC SERPL-MCNC: 332 MCG/DL (CALC) (ref 250–450)
TSH SERPL-ACNC: 2.31 MIU/L (ref 0.4–4.5)
WBC # BLD AUTO: 4.4 THOUSAND/UL (ref 3.8–10.8)

## 2025-09-05 ENCOUNTER — APPOINTMENT (OUTPATIENT)
Dept: RADIOLOGY | Facility: HOSPITAL | Age: 70
End: 2025-09-05
Payer: MEDICARE

## 2025-11-18 ENCOUNTER — APPOINTMENT (OUTPATIENT)
Facility: CLINIC | Age: 70
End: 2025-11-18
Payer: MEDICARE